# Patient Record
Sex: FEMALE | Race: WHITE | NOT HISPANIC OR LATINO
[De-identification: names, ages, dates, MRNs, and addresses within clinical notes are randomized per-mention and may not be internally consistent; named-entity substitution may affect disease eponyms.]

---

## 2017-01-12 ENCOUNTER — RESULT CHARGE (OUTPATIENT)
Age: 72
End: 2017-01-12

## 2017-01-12 ENCOUNTER — APPOINTMENT (OUTPATIENT)
Dept: FAMILY MEDICINE | Facility: CLINIC | Age: 72
End: 2017-01-12

## 2017-01-12 VITALS
BODY MASS INDEX: 30.83 KG/M2 | OXYGEN SATURATION: 96 % | WEIGHT: 174 LBS | TEMPERATURE: 98 F | DIASTOLIC BLOOD PRESSURE: 82 MMHG | HEIGHT: 63 IN | SYSTOLIC BLOOD PRESSURE: 134 MMHG | HEART RATE: 69 BPM

## 2017-01-12 DIAGNOSIS — Z23 ENCOUNTER FOR IMMUNIZATION: ICD-10-CM

## 2017-01-12 DIAGNOSIS — Z87.2 PERSONAL HISTORY OF DISEASES OF THE SKIN AND SUBCUTANEOUS TISSUE: ICD-10-CM

## 2017-01-12 DIAGNOSIS — M25.562 PAIN IN LEFT KNEE: ICD-10-CM

## 2017-01-12 DIAGNOSIS — Z86.39 PERSONAL HISTORY OF OTHER ENDOCRINE, NUTRITIONAL AND METABOLIC DISEASE: ICD-10-CM

## 2017-01-12 DIAGNOSIS — Z87.09 PERSONAL HISTORY OF OTHER DISEASES OF THE RESPIRATORY SYSTEM: ICD-10-CM

## 2017-01-12 DIAGNOSIS — B35.1 TINEA UNGUIUM: ICD-10-CM

## 2017-01-12 DIAGNOSIS — M79.672 PAIN IN LEFT FOOT: ICD-10-CM

## 2017-01-12 LAB — HBA1C MFR BLD HPLC: 8

## 2017-01-15 ENCOUNTER — RESULT REVIEW (OUTPATIENT)
Age: 72
End: 2017-01-15

## 2017-02-04 LAB
ALBUMIN SERPL ELPH-MCNC: 4.6 G/DL
ALP BLD-CCNC: 62 U/L
ALT SERPL-CCNC: 23 U/L
ANION GAP SERPL CALC-SCNC: 18 MMOL/L
AST SERPL-CCNC: 17 U/L
BILIRUB SERPL-MCNC: 0.5 MG/DL
BUN SERPL-MCNC: 17 MG/DL
CALCIUM SERPL-MCNC: 10 MG/DL
CHLORIDE SERPL-SCNC: 98 MMOL/L
CO2 SERPL-SCNC: 25 MMOL/L
CREAT SERPL-MCNC: 0.75 MG/DL
GLUCOSE SERPL-MCNC: 193 MG/DL
POTASSIUM SERPL-SCNC: 3.8 MMOL/L
PROT SERPL-MCNC: 7.4 G/DL
SODIUM SERPL-SCNC: 141 MMOL/L

## 2017-03-26 ENCOUNTER — FORM ENCOUNTER (OUTPATIENT)
Age: 72
End: 2017-03-26

## 2017-03-27 ENCOUNTER — APPOINTMENT (OUTPATIENT)
Dept: MAMMOGRAPHY | Facility: CLINIC | Age: 72
End: 2017-03-27

## 2017-03-27 ENCOUNTER — OUTPATIENT (OUTPATIENT)
Dept: OUTPATIENT SERVICES | Facility: HOSPITAL | Age: 72
LOS: 1 days | End: 2017-03-27
Payer: COMMERCIAL

## 2017-03-27 DIAGNOSIS — Z00.00 ENCOUNTER FOR GENERAL ADULT MEDICAL EXAMINATION WITHOUT ABNORMAL FINDINGS: ICD-10-CM

## 2017-03-27 PROCEDURE — 77067 SCR MAMMO BI INCL CAD: CPT

## 2017-03-27 PROCEDURE — 77063 BREAST TOMOSYNTHESIS BI: CPT

## 2017-04-05 ENCOUNTER — APPOINTMENT (OUTPATIENT)
Dept: FAMILY MEDICINE | Facility: CLINIC | Age: 72
End: 2017-04-05

## 2017-04-05 VITALS
SYSTOLIC BLOOD PRESSURE: 141 MMHG | DIASTOLIC BLOOD PRESSURE: 92 MMHG | HEART RATE: 64 BPM | HEIGHT: 63 IN | WEIGHT: 174 LBS | BODY MASS INDEX: 30.83 KG/M2 | TEMPERATURE: 98.1 F | OXYGEN SATURATION: 97 %

## 2017-04-05 LAB — HBA1C MFR BLD HPLC: 7.1

## 2017-06-15 ENCOUNTER — MEDICATION RENEWAL (OUTPATIENT)
Age: 72
End: 2017-06-15

## 2017-07-12 ENCOUNTER — RESULT CHARGE (OUTPATIENT)
Age: 72
End: 2017-07-12

## 2017-07-13 ENCOUNTER — APPOINTMENT (OUTPATIENT)
Dept: FAMILY MEDICINE | Facility: CLINIC | Age: 72
End: 2017-07-13

## 2017-07-13 VITALS
TEMPERATURE: 98.3 F | DIASTOLIC BLOOD PRESSURE: 80 MMHG | HEART RATE: 81 BPM | HEIGHT: 63 IN | WEIGHT: 173 LBS | SYSTOLIC BLOOD PRESSURE: 135 MMHG | OXYGEN SATURATION: 99 % | BODY MASS INDEX: 30.65 KG/M2

## 2017-07-14 LAB
25(OH)D3 SERPL-MCNC: 16.1 NG/ML
ALBUMIN SERPL ELPH-MCNC: 4.5 G/DL
ALP BLD-CCNC: 52 U/L
ALT SERPL-CCNC: 23 U/L
ANION GAP SERPL CALC-SCNC: 16 MMOL/L
APPEARANCE: CLEAR
AST SERPL-CCNC: 17 U/L
BACTERIA: NEGATIVE
BASOPHILS # BLD AUTO: 0.02 K/UL
BASOPHILS NFR BLD AUTO: 0.5 %
BILIRUB SERPL-MCNC: 0.4 MG/DL
BILIRUBIN URINE: NEGATIVE
BLOOD URINE: NEGATIVE
BUN SERPL-MCNC: 19 MG/DL
CALCIUM SERPL-MCNC: 10.1 MG/DL
CHLORIDE SERPL-SCNC: 101 MMOL/L
CHOLEST SERPL-MCNC: 212 MG/DL
CHOLEST/HDLC SERPL: 3 RATIO
CO2 SERPL-SCNC: 23 MMOL/L
COLOR: YELLOW
CREAT SERPL-MCNC: 0.82 MG/DL
CREAT SPEC-SCNC: 207 MG/DL
EOSINOPHIL # BLD AUTO: 0.09 K/UL
EOSINOPHIL NFR BLD AUTO: 2.1 %
GLUCOSE QUALITATIVE U: NORMAL MG/DL
GLUCOSE SERPL-MCNC: 155 MG/DL
HBA1C MFR BLD HPLC: 7.3
HCT VFR BLD CALC: 46.5 %
HDLC SERPL-MCNC: 70 MG/DL
HGB BLD-MCNC: 15.6 G/DL
IMM GRANULOCYTES NFR BLD AUTO: 0 %
KETONES URINE: NEGATIVE
LDLC SERPL CALC-MCNC: 121 MG/DL
LEUKOCYTE ESTERASE URINE: NEGATIVE
LYMPHOCYTES # BLD AUTO: 2.01 K/UL
LYMPHOCYTES NFR BLD AUTO: 46.1 %
MAN DIFF?: NORMAL
MCHC RBC-ENTMCNC: 30.4 PG
MCHC RBC-ENTMCNC: 33.5 GM/DL
MCV RBC AUTO: 90.5 FL
MICROALBUMIN 24H UR DL<=1MG/L-MCNC: 0.6 MG/DL
MICROALBUMIN/CREAT 24H UR-RTO: 3 MG/G
MICROSCOPIC-UA: NORMAL
MONOCYTES # BLD AUTO: 0.4 K/UL
MONOCYTES NFR BLD AUTO: 9.2 %
NEUTROPHILS # BLD AUTO: 1.84 K/UL
NEUTROPHILS NFR BLD AUTO: 42.1 %
NITRITE URINE: NEGATIVE
PH URINE: 6
PLATELET # BLD AUTO: 182 K/UL
POTASSIUM SERPL-SCNC: 4 MMOL/L
PROT SERPL-MCNC: 7.4 G/DL
PROTEIN URINE: NEGATIVE MG/DL
RBC # BLD: 5.14 M/UL
RBC # FLD: 12.6 %
RED BLOOD CELLS URINE: 0 /HPF
SODIUM SERPL-SCNC: 140 MMOL/L
SPECIFIC GRAVITY URINE: 1.03
SQUAMOUS EPITHELIAL CELLS: 10 /HPF
TRIGL SERPL-MCNC: 106 MG/DL
TSH SERPL-ACNC: 1.42 UIU/ML
UROBILINOGEN URINE: 1 MG/DL
WBC # FLD AUTO: 4.36 K/UL
WHITE BLOOD CELLS URINE: 1 /HPF

## 2017-07-24 ENCOUNTER — FORM ENCOUNTER (OUTPATIENT)
Age: 72
End: 2017-07-24

## 2017-07-25 ENCOUNTER — APPOINTMENT (OUTPATIENT)
Dept: RADIOLOGY | Facility: CLINIC | Age: 72
End: 2017-07-25

## 2017-07-25 ENCOUNTER — OUTPATIENT (OUTPATIENT)
Dept: OUTPATIENT SERVICES | Facility: HOSPITAL | Age: 72
LOS: 1 days | End: 2017-07-25
Payer: COMMERCIAL

## 2017-07-25 DIAGNOSIS — M85.80 OTHER SPECIFIED DISORDERS OF BONE DENSITY AND STRUCTURE, UNSPECIFIED SITE: ICD-10-CM

## 2017-07-25 PROCEDURE — 77080 DXA BONE DENSITY AXIAL: CPT

## 2017-09-08 ENCOUNTER — APPOINTMENT (OUTPATIENT)
Dept: FAMILY MEDICINE | Facility: CLINIC | Age: 72
End: 2017-09-08
Payer: MEDICARE

## 2017-09-08 VITALS
SYSTOLIC BLOOD PRESSURE: 138 MMHG | TEMPERATURE: 98.5 F | OXYGEN SATURATION: 100 % | BODY MASS INDEX: 31.01 KG/M2 | DIASTOLIC BLOOD PRESSURE: 76 MMHG | HEART RATE: 69 BPM | WEIGHT: 175 LBS | HEIGHT: 63 IN

## 2017-09-08 PROCEDURE — 99213 OFFICE O/P EST LOW 20 MIN: CPT

## 2017-09-11 LAB
ALBUMIN SERPL ELPH-MCNC: 3.7 G/DL
ALP BLD-CCNC: 49 U/L
ALT SERPL-CCNC: 20 U/L
ANION GAP SERPL CALC-SCNC: 14 MMOL/L
AST SERPL-CCNC: 16 U/L
BILIRUB SERPL-MCNC: 0.2 MG/DL
BUN SERPL-MCNC: 10 MG/DL
CALCIUM SERPL-MCNC: 9.5 MG/DL
CHLORIDE SERPL-SCNC: 103 MMOL/L
CO2 SERPL-SCNC: 27 MMOL/L
CREAT SERPL-MCNC: 0.59 MG/DL
GLUCOSE SERPL-MCNC: 125 MG/DL
POTASSIUM SERPL-SCNC: 3.7 MMOL/L
PROT SERPL-MCNC: 7.3 G/DL
SODIUM SERPL-SCNC: 144 MMOL/L

## 2017-10-23 ENCOUNTER — RESULT CHARGE (OUTPATIENT)
Age: 72
End: 2017-10-23

## 2017-10-23 ENCOUNTER — APPOINTMENT (OUTPATIENT)
Dept: FAMILY MEDICINE | Facility: CLINIC | Age: 72
End: 2017-10-23
Payer: MEDICARE

## 2017-10-23 VITALS
HEIGHT: 63 IN | OXYGEN SATURATION: 97 % | DIASTOLIC BLOOD PRESSURE: 74 MMHG | BODY MASS INDEX: 30.65 KG/M2 | SYSTOLIC BLOOD PRESSURE: 130 MMHG | TEMPERATURE: 98.7 F | WEIGHT: 173 LBS | HEART RATE: 69 BPM

## 2017-10-23 DIAGNOSIS — Z87.898 PERSONAL HISTORY OF OTHER SPECIFIED CONDITIONS: ICD-10-CM

## 2017-10-23 DIAGNOSIS — K52.9 NONINFECTIVE GASTROENTERITIS AND COLITIS, UNSPECIFIED: ICD-10-CM

## 2017-10-23 PROCEDURE — 99213 OFFICE O/P EST LOW 20 MIN: CPT | Mod: 25

## 2017-10-23 PROCEDURE — 83036 HEMOGLOBIN GLYCOSYLATED A1C: CPT | Mod: QW

## 2017-10-23 RX ORDER — METRONIDAZOLE 500 MG/1
500 TABLET ORAL TWICE DAILY
Qty: 14 | Refills: 0 | Status: COMPLETED | COMMUNITY
Start: 2017-09-08 | End: 2017-10-23

## 2017-10-23 RX ORDER — LOPERAMIDE HYDROCHLORIDE 2 MG/1
2 CAPSULE ORAL
Qty: 1 | Refills: 0 | Status: COMPLETED | COMMUNITY
Start: 2017-09-08 | End: 2017-10-23

## 2017-10-31 LAB — HBA1C MFR BLD HPLC: 7.3

## 2017-12-27 ENCOUNTER — RX RENEWAL (OUTPATIENT)
Age: 72
End: 2017-12-27

## 2018-01-12 ENCOUNTER — APPOINTMENT (OUTPATIENT)
Dept: GASTROENTEROLOGY | Facility: CLINIC | Age: 73
End: 2018-01-12

## 2018-01-22 ENCOUNTER — RESULT CHARGE (OUTPATIENT)
Age: 73
End: 2018-01-22

## 2018-01-22 ENCOUNTER — APPOINTMENT (OUTPATIENT)
Dept: FAMILY MEDICINE | Facility: CLINIC | Age: 73
End: 2018-01-22
Payer: COMMERCIAL

## 2018-01-22 VITALS
BODY MASS INDEX: 31.01 KG/M2 | TEMPERATURE: 97.8 F | SYSTOLIC BLOOD PRESSURE: 128 MMHG | WEIGHT: 175 LBS | OXYGEN SATURATION: 95 % | DIASTOLIC BLOOD PRESSURE: 75 MMHG | HEIGHT: 63 IN | HEART RATE: 79 BPM

## 2018-01-22 PROCEDURE — 99213 OFFICE O/P EST LOW 20 MIN: CPT | Mod: 25

## 2018-01-22 PROCEDURE — 83036 HEMOGLOBIN GLYCOSYLATED A1C: CPT | Mod: QW

## 2018-01-24 LAB — HBA1C MFR BLD HPLC: 7.8

## 2018-03-09 ENCOUNTER — APPOINTMENT (OUTPATIENT)
Dept: GASTROENTEROLOGY | Facility: CLINIC | Age: 73
End: 2018-03-09
Payer: COMMERCIAL

## 2018-03-09 VITALS — WEIGHT: 178 LBS | HEIGHT: 63 IN | BODY MASS INDEX: 31.54 KG/M2

## 2018-03-09 VITALS — SYSTOLIC BLOOD PRESSURE: 126 MMHG | DIASTOLIC BLOOD PRESSURE: 74 MMHG | HEART RATE: 70 BPM

## 2018-03-09 PROCEDURE — 99202 OFFICE O/P NEW SF 15 MIN: CPT

## 2018-04-30 ENCOUNTER — APPOINTMENT (OUTPATIENT)
Dept: FAMILY MEDICINE | Facility: CLINIC | Age: 73
End: 2018-04-30
Payer: COMMERCIAL

## 2018-04-30 ENCOUNTER — RESULT CHARGE (OUTPATIENT)
Age: 73
End: 2018-04-30

## 2018-04-30 VITALS
TEMPERATURE: 97.6 F | HEART RATE: 70 BPM | HEIGHT: 63 IN | DIASTOLIC BLOOD PRESSURE: 95 MMHG | WEIGHT: 174 LBS | OXYGEN SATURATION: 98 % | BODY MASS INDEX: 30.83 KG/M2 | SYSTOLIC BLOOD PRESSURE: 159 MMHG

## 2018-04-30 LAB — HBA1C MFR BLD HPLC: 7.9

## 2018-04-30 PROCEDURE — 99214 OFFICE O/P EST MOD 30 MIN: CPT

## 2018-06-04 ENCOUNTER — APPOINTMENT (OUTPATIENT)
Dept: FAMILY MEDICINE | Facility: CLINIC | Age: 73
End: 2018-06-04
Payer: COMMERCIAL

## 2018-06-04 VITALS
WEIGHT: 174 LBS | SYSTOLIC BLOOD PRESSURE: 126 MMHG | OXYGEN SATURATION: 98 % | DIASTOLIC BLOOD PRESSURE: 68 MMHG | TEMPERATURE: 98.4 F | HEART RATE: 72 BPM | BODY MASS INDEX: 30.83 KG/M2 | HEIGHT: 63 IN

## 2018-06-04 PROCEDURE — 99213 OFFICE O/P EST LOW 20 MIN: CPT | Mod: 25

## 2018-06-04 PROCEDURE — 93000 ELECTROCARDIOGRAM COMPLETE: CPT

## 2018-06-04 NOTE — ASSESSMENT
[FreeTextEntry1] : electrocardiogram completely within normal limits. Episode of dizziness and diaphoresis might have been related to vasovagal versus hypoglycemia. I have advised patient that in the event that these episodes become recurrent to check her fingerstick hasn't she can to ascertain whether this is a hypoglycemic event or otherwise.\par The patient will continue on current medications, no changes have been made today. No further workup at this time since the patient's symptoms and have been an isolated event and feels fine now.

## 2018-06-04 NOTE — HEALTH RISK ASSESSMENT
[No falls in past year] : Patient reported no falls in the past year [0] : 2) Feeling down, depressed, or hopeless: Not at all (0) [] : No [[id number]] : 0

## 2018-06-04 NOTE — HISTORY OF PRESENT ILLNESS
[FreeTextEntry8] : the patient presents to the office complaining of one episode of dizziness and diaphoresis which occurred 3 days ago while at work. Patient states that she woke up on Friday, June 1, her blood sugars fasting were 147, had her breakfast and went to work, while she was at work she entered the room which she states she was extremely hot a lot she was there working all of a sudden she became dizzy to the point that she had to sit down also became diaphoretic, the patient left the room and went to sit down to rest did not eat anything, did not check her blood pressure or her blood sugars, to some water and after 15 minutes she felt fine, she went home, 8 and went to sleep and she has been feeling fine since. Patient denies any nausea, vomiting, chest pain or shortness of breath.

## 2018-06-04 NOTE — PHYSICAL EXAM
[No Acute Distress] : no acute distress [Well Nourished] : well nourished [Well Developed] : well developed [Well-Appearing] : well-appearing [No JVD] : no jugular venous distention [Supple] : supple [No Lymphadenopathy] : no lymphadenopathy [Thyroid Normal, No Nodules] : the thyroid was normal and there were no nodules present [No Respiratory Distress] : no respiratory distress  [Clear to Auscultation] : lungs were clear to auscultation bilaterally [No Accessory Muscle Use] : no accessory muscle use [Normal Rate] : normal rate  [Regular Rhythm] : with a regular rhythm [Normal S1, S2] : normal S1 and S2 [No Murmur] : no murmur heard [No Edema] : there was no peripheral edema [Non Tender] : non-tender [No HSM] : no HSM

## 2018-06-18 ENCOUNTER — APPOINTMENT (OUTPATIENT)
Dept: GASTROENTEROLOGY | Facility: GI CENTER | Age: 73
End: 2018-06-18
Payer: COMMERCIAL

## 2018-06-18 ENCOUNTER — OUTPATIENT (OUTPATIENT)
Dept: OUTPATIENT SERVICES | Facility: HOSPITAL | Age: 73
LOS: 1 days | End: 2018-06-18
Payer: COMMERCIAL

## 2018-06-18 VITALS
WEIGHT: 174 LBS | BODY MASS INDEX: 30.83 KG/M2 | DIASTOLIC BLOOD PRESSURE: 70 MMHG | TEMPERATURE: 98 F | RESPIRATION RATE: 12 BRPM | SYSTOLIC BLOOD PRESSURE: 130 MMHG | HEART RATE: 80 BPM | HEIGHT: 63 IN

## 2018-06-18 DIAGNOSIS — Z12.11 ENCOUNTER FOR SCREENING FOR MALIGNANT NEOPLASM OF COLON: ICD-10-CM

## 2018-06-18 DIAGNOSIS — K64.8 OTHER HEMORRHOIDS: ICD-10-CM

## 2018-06-18 LAB — GLUCOSE BLDC GLUCOMTR-MCNC: 174 MG/DL — HIGH (ref 70–99)

## 2018-06-18 PROCEDURE — 45378 DIAGNOSTIC COLONOSCOPY: CPT

## 2018-06-18 PROCEDURE — 82962 GLUCOSE BLOOD TEST: CPT

## 2018-06-18 PROCEDURE — G0121: CPT

## 2018-07-16 ENCOUNTER — APPOINTMENT (OUTPATIENT)
Dept: FAMILY MEDICINE | Facility: CLINIC | Age: 73
End: 2018-07-16
Payer: COMMERCIAL

## 2018-07-16 VITALS
HEART RATE: 73 BPM | TEMPERATURE: 98.4 F | HEIGHT: 63 IN | BODY MASS INDEX: 31.54 KG/M2 | WEIGHT: 178 LBS | OXYGEN SATURATION: 96 % | SYSTOLIC BLOOD PRESSURE: 120 MMHG | DIASTOLIC BLOOD PRESSURE: 68 MMHG

## 2018-07-16 DIAGNOSIS — R20.2 PARESTHESIA OF SKIN: ICD-10-CM

## 2018-07-16 DIAGNOSIS — Z87.898 PERSONAL HISTORY OF OTHER SPECIFIED CONDITIONS: ICD-10-CM

## 2018-07-16 DIAGNOSIS — M25.572 PAIN IN LEFT ANKLE AND JOINTS OF LEFT FOOT: ICD-10-CM

## 2018-07-16 DIAGNOSIS — Z12.11 ENCOUNTER FOR SCREENING FOR MALIGNANT NEOPLASM OF COLON: ICD-10-CM

## 2018-07-16 PROCEDURE — G0439: CPT

## 2018-07-16 PROCEDURE — 99397 PER PM REEVAL EST PAT 65+ YR: CPT | Mod: 25

## 2018-07-16 PROCEDURE — 36415 COLL VENOUS BLD VENIPUNCTURE: CPT

## 2018-07-16 NOTE — PAST MEDICAL HISTORY
[Postmenopausal] : postmenopausal [Menarche Age ____] : age at menarche was [unfilled] [Menopause Age____] : age at menopause was [unfilled] [Total Preg ___] : G[unfilled] [Live Births ___] : P[unfilled]  [Full Term ___] : Full Term: [unfilled] [Living ___] : Living: [unfilled]

## 2018-07-16 NOTE — COUNSELING
[Weight management counseling provided] : Weight management [Healthy eating counseling provided] : healthy eating [Activity counseling provided] : activity [Low Fat Diet] : Low fat diet [Decrease Portions] : Decrease food portions [Low Salt Diet] : Low salt diet [___ min/wk activity recommended] : [unfilled] min/wk activity recommended [Walking] : Walking [None] : None [Good understanding] : Patient has a good understanding of lifestyle changes and the steps needed to achieve self management goals

## 2018-07-17 ENCOUNTER — RESULT CHARGE (OUTPATIENT)
Age: 73
End: 2018-07-17

## 2018-07-23 LAB — HBA1C MFR BLD HPLC: 7.8

## 2018-07-24 NOTE — ASSESSMENT
[FreeTextEntry1] : This is a 72-year-old female past medical history of hypertension, osteopenia, type 2 diabetes, lumbago, diverticulosis, presenting to the office for a complete physical exam. \par \par Cardiovascular: History hypertension.\par -Patient's blood pressure under control on current regimen.\par -Continue losartan/hydrochlorothiazide 100/25 once daily.\par -Continue simvastatin 10 mg at bedtime.\par -Diet and moderate exercise as tolerated has been discussed with the patient\par \par Endocrine: History of type 2 diabetes.\par -Continue metformin 850 mg twice daily, glipizide ER 5 mg once daily.\par -Continue blood glucose monitoring at least once a day.\par -Last hemoglobin A1c was 7.9 in April, today she A1c 7.8.\par -Last diabetic eye exam in January 2018, no retinopathy.\par \par Rheumatology: History of osteopenia.\par -Continue calcium and vitamin D supplementation.\par -Last bone density testing done in July 2017.\par \par Health care maintenance:\par -Patient had a mammogram done in March 2017, BI-RADS 2, will get a mammogram referral today.\par -Patient had a colonoscopy screening in June 2018.\par -Patient had bone density testing in July 2017, found with osteopenia.\par -Patient deferred influenza vaccine, Pneumonia vaccine and zoster vaccine.\par -Patient had a tetanus vaccine given in April 2017.

## 2018-07-24 NOTE — HISTORY OF PRESENT ILLNESS
[FreeTextEntry1] : " I am here for my physical exam " [de-identified] : This is a 72-year-old female past medical history of hypertension, osteopenia, type 2 diabetes, lumbago, diverticulosis, presenting to the office for a complete physical exam. The patient offers no acute complaints at this time.

## 2018-07-24 NOTE — HEALTH RISK ASSESSMENT
[Good] : ~his/her~  mood as  good [No falls in past year] : Patient reported no falls in the past year [0] : 2) Feeling down, depressed, or hopeless: Not at all (0) [Patient reported PAP Smear was normal] : Patient reported PAP Smear was normal [HIV test declined] : HIV test declined [Hepatitis C test declined] : Hepatitis C test declined [None] : None [Alone] : lives alone [Employed] : employed [High School] : high school [] :  [# Of Children ___] : has [unfilled] children [Feels Safe at Home] : Feels safe at home [Fully functional (bathing, dressing, toileting, transferring, walking, feeding)] : Fully functional (bathing, dressing, toileting, transferring, walking, feeding) [Fully functional (using the telephone, shopping, preparing meals, housekeeping, doing laundry, using] : Fully functional and needs no help or supervision to perform IADLs (using the telephone, shopping, preparing meals, housekeeping, doing laundry, using transportation, managing medications and managing finances) [Smoke Detector] : smoke detector [Carbon Monoxide Detector] : carbon monoxide detector [Seat Belt] :  uses seat belt [Sunscreen] : uses sunscreen [Patient declined discussion] : Patient declined discussion [] : No [[id number]] : 0 [Change in mental status noted] : No change in mental status noted [Language] : denies difficulty with language [Behavior] : denies difficulty with behavior [Learning/Retaining New Information] : denies difficulty learning/retaining new information [Handling Complex Tasks] : denies difficulty handling complex tasks [Reasoning] : denies difficulty with reasoning [Spatial Ability and Orientation] : denies difficulty with spatial ability and orientation [Sexually Active] : not sexually active [High Risk Behavior] : no high risk behavior [Reports changes in hearing] : Reports no changes in hearing [Reports changes in vision] : Reports no changes in vision [Reports changes in dental health] : Reports no changes in dental health [Guns at Home] : no guns at home [TB Exposure] : is not being exposed to tuberculosis [MammogramDate] : 03/2017 [PapSmearDate] : 03/2017 [BoneDensityDate] : 07/2017 [ColonoscopyDate] : 06/2018 [ColonoscopyComments] : Normal, repeat in 10 years. 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

## 2018-08-02 ENCOUNTER — MEDICATION RENEWAL (OUTPATIENT)
Age: 73
End: 2018-08-02

## 2018-08-02 LAB
25(OH)D3 SERPL-MCNC: 16.2 NG/ML
ALBUMIN SERPL ELPH-MCNC: 4.3 G/DL
ALP BLD-CCNC: 44 U/L
ALT SERPL-CCNC: 23 U/L
ANION GAP SERPL CALC-SCNC: 13 MMOL/L
AST SERPL-CCNC: 16 U/L
BASOPHILS # BLD AUTO: 0.02 K/UL
BASOPHILS NFR BLD AUTO: 0.4 %
BILIRUB SERPL-MCNC: 0.4 MG/DL
BUN SERPL-MCNC: 20 MG/DL
CALCIUM SERPL-MCNC: 9.5 MG/DL
CHLORIDE SERPL-SCNC: 103 MMOL/L
CHOLEST SERPL-MCNC: 206 MG/DL
CHOLEST/HDLC SERPL: 3.6 RATIO
CO2 SERPL-SCNC: 24 MMOL/L
CREAT SERPL-MCNC: 0.63 MG/DL
EOSINOPHIL # BLD AUTO: 0.08 K/UL
EOSINOPHIL NFR BLD AUTO: 1.8 %
GLUCOSE SERPL-MCNC: 185 MG/DL
HCT VFR BLD CALC: 45.1 %
HDLC SERPL-MCNC: 57 MG/DL
HGB BLD-MCNC: 14.9 G/DL
IMM GRANULOCYTES NFR BLD AUTO: 0 %
LDLC SERPL CALC-MCNC: 118 MG/DL
LYMPHOCYTES # BLD AUTO: 1.97 K/UL
LYMPHOCYTES NFR BLD AUTO: 44.3 %
MAN DIFF?: NORMAL
MCHC RBC-ENTMCNC: 29.3 PG
MCHC RBC-ENTMCNC: 33 GM/DL
MCV RBC AUTO: 88.6 FL
MONOCYTES # BLD AUTO: 0.41 K/UL
MONOCYTES NFR BLD AUTO: 9.2 %
NEUTROPHILS # BLD AUTO: 1.97 K/UL
NEUTROPHILS NFR BLD AUTO: 44.3 %
PLATELET # BLD AUTO: 190 K/UL
POTASSIUM SERPL-SCNC: 4.1 MMOL/L
PROT SERPL-MCNC: 7.2 G/DL
RBC # BLD: 5.09 M/UL
RBC # FLD: 12.2 %
SODIUM SERPL-SCNC: 140 MMOL/L
TRIGL SERPL-MCNC: 155 MG/DL
TSH SERPL-ACNC: 1.27 UIU/ML
WBC # FLD AUTO: 4.45 K/UL

## 2018-08-05 ENCOUNTER — FORM ENCOUNTER (OUTPATIENT)
Age: 73
End: 2018-08-05

## 2018-08-06 ENCOUNTER — OUTPATIENT (OUTPATIENT)
Dept: OUTPATIENT SERVICES | Facility: HOSPITAL | Age: 73
LOS: 1 days | End: 2018-08-06
Payer: MEDICARE

## 2018-08-06 ENCOUNTER — APPOINTMENT (OUTPATIENT)
Dept: MAMMOGRAPHY | Facility: CLINIC | Age: 73
End: 2018-08-06
Payer: COMMERCIAL

## 2018-08-06 DIAGNOSIS — Z00.00 ENCOUNTER FOR GENERAL ADULT MEDICAL EXAMINATION WITHOUT ABNORMAL FINDINGS: ICD-10-CM

## 2018-08-06 PROCEDURE — 77063 BREAST TOMOSYNTHESIS BI: CPT

## 2018-08-06 PROCEDURE — 77067 SCR MAMMO BI INCL CAD: CPT

## 2018-08-06 PROCEDURE — 77063 BREAST TOMOSYNTHESIS BI: CPT | Mod: 26

## 2018-08-06 PROCEDURE — 77067 SCR MAMMO BI INCL CAD: CPT | Mod: 26

## 2018-08-14 ENCOUNTER — RX RENEWAL (OUTPATIENT)
Age: 73
End: 2018-08-14

## 2018-09-06 ENCOUNTER — MEDICATION RENEWAL (OUTPATIENT)
Age: 73
End: 2018-09-06

## 2018-10-15 ENCOUNTER — APPOINTMENT (OUTPATIENT)
Dept: FAMILY MEDICINE | Facility: CLINIC | Age: 73
End: 2018-10-15
Payer: COMMERCIAL

## 2018-10-15 VITALS
BODY MASS INDEX: 31.54 KG/M2 | OXYGEN SATURATION: 95 % | TEMPERATURE: 97.71 F | HEIGHT: 63 IN | SYSTOLIC BLOOD PRESSURE: 157 MMHG | WEIGHT: 178 LBS | DIASTOLIC BLOOD PRESSURE: 73 MMHG | HEART RATE: 65 BPM

## 2018-10-15 LAB — HBA1C MFR BLD HPLC: 8.1

## 2018-10-15 PROCEDURE — 83036 HEMOGLOBIN GLYCOSYLATED A1C: CPT | Mod: QW

## 2018-10-15 PROCEDURE — 99214 OFFICE O/P EST MOD 30 MIN: CPT | Mod: 25

## 2018-10-15 NOTE — HEALTH RISK ASSESSMENT
[] : No [No falls in past year] : Patient reported no falls in the past year [0] : 2) Feeling down, depressed, or hopeless: Not at all (0) [[id number]] : 0

## 2018-10-15 NOTE — HISTORY OF PRESENT ILLNESS
[FreeTextEntry1] : " I am here for my diabetes check up" [de-identified] : The patient presents to the office for a diabetes checkup, no acute complaints at this time. The patient states that she has been taking metformin 850 mg orally once a day with her lunch because she states she was getting dizzy when she was taking it. Patient states that her fasting blood sugars are around 150s most of the time. Patient only takes her blood sugar levels fasting and is not checking them every day.

## 2018-10-15 NOTE — PLAN
[FreeTextEntry1] : \par \par Case discussed with and reviewed by supervising attending Dr. Sari Evans M.D.

## 2018-10-15 NOTE — PAST MEDICAL HISTORY
[Surgical Menopause] : in surgical menopause [Menarche Age ____] : age at menarche was [unfilled] [Menopause Age____] : age at menopause was [unfilled] [Total Preg ___] : G[unfilled] [Live Births ___] : P[unfilled]  [Full Term ___] : Full Term: [unfilled] [Living ___] : Living: [unfilled]

## 2018-10-15 NOTE — ASSESSMENT
[FreeTextEntry1] : This is a 72-year-old female past medical history of hypertension, osteopenia, type 2 diabetes, lumbago, diverticulosis, presenting to the office for Diabetes followup. \par \par Endocrine: History of type 2 diabetes.\par -Last hemoglobin A1c was 7.8, Today A1c is 8.1\par -Will decrease metformin to 850 mg once daily as patient was feeling dizzy and the patient was only taking it once a day. If numbers do not improve we may change metformin to 500 mg twice a day\par -Continue metformin 850 mg with last meal of the day, glipizide ER 5 mg once daily.\par -Fasting blood sugars goal is less than 120.\par -Continue blood glucose monitoring Twice a day.\par -Last diabetic eye exam in January 2018, no retinopathy.\par \par Cardiovascular: History hypertension.\par -Patient's blood pressure elevated today but the patient did not take her medication..\par -Continue losartan/hydrochlorothiazide 100/25 once daily.\par -Continue simvastatin 10 mg at bedtime.\par -Diet and moderate exercise as tolerated has been discussed with the patient\par \par Rheumatology: History of osteopenia, Low vitamin D level\par -Continue calcium and vitamin D supplementation.\par -Last bone density testing done in July 2017.\par \par Health care maintenance:\par -Patient had a mammogram done in August 2018, BI-RADS 2.\par -Patient has not had a recent Pap smear, will give her referral today.\par -Patient had a colonoscopy screening in June 2018.\par -Patient had bone density testing in July 2017, found with osteopenia.\par -Patient deferred influenza vaccine, Pneumonia vaccine and zoster vaccine.\par -Patient had a tetanus vaccine given in April 2017.

## 2019-01-14 ENCOUNTER — APPOINTMENT (OUTPATIENT)
Dept: FAMILY MEDICINE | Facility: CLINIC | Age: 74
End: 2019-01-14
Payer: COMMERCIAL

## 2019-01-14 ENCOUNTER — RECORD ABSTRACTING (OUTPATIENT)
Age: 74
End: 2019-01-14

## 2019-01-14 VITALS
DIASTOLIC BLOOD PRESSURE: 71 MMHG | OXYGEN SATURATION: 98 % | SYSTOLIC BLOOD PRESSURE: 120 MMHG | WEIGHT: 175 LBS | HEART RATE: 78 BPM | TEMPERATURE: 98.3 F | HEIGHT: 63 IN | BODY MASS INDEX: 31.01 KG/M2

## 2019-01-14 DIAGNOSIS — N95.2 POSTMENOPAUSAL ATROPHIC VAGINITIS: ICD-10-CM

## 2019-01-14 DIAGNOSIS — Z86.39 PERSONAL HISTORY OF OTHER ENDOCRINE, NUTRITIONAL AND METABOLIC DISEASE: ICD-10-CM

## 2019-01-14 LAB
CYTOLOGY CVX/VAG DOC THIN PREP: NORMAL
HBA1C MFR BLD HPLC: 7.9

## 2019-01-14 PROCEDURE — 99214 OFFICE O/P EST MOD 30 MIN: CPT | Mod: 25

## 2019-01-14 PROCEDURE — 83036 HEMOGLOBIN GLYCOSYLATED A1C: CPT | Mod: QW

## 2019-01-14 RX ORDER — IBUPROFEN 600 MG/1
600 TABLET, FILM COATED ORAL 3 TIMES DAILY
Qty: 15 | Refills: 1 | Status: COMPLETED | COMMUNITY
Start: 2018-04-30 | End: 2019-01-14

## 2019-01-14 NOTE — ASSESSMENT
[FreeTextEntry1] : This is a 72-year-old female past medical history of hypertension, osteopenia, type 2 diabetes, lumbago, diverticulosis, presenting to the office for Diabetes followup. \par \par Endocrine: History of type 2 diabetes.\par -Last hemoglobin A1c was 8.1, Today A1c is 7.9\par -Continue Metformin 850 mg once daily. If numbers do not improve we may change metformin to 500 mg twice a day, continue Glipizide ER 5 mg once daily.\par -Fasting blood sugars goal is less than 120.\par -Continue blood glucose monitoring daily.\par -Last diabetic eye exam in January 2018, no retinopathy, has appointment Feb 4th.\par \par Cardiovascular: History hypertension.\par -Patient's blood pressure normal.\par -Continue losartan/hydrochlorothiazide 100/25 once daily.\par -Continue simvastatin 10 mg at bedtime.\par -Diet and moderate exercise as tolerated has been discussed with the patient\par \par Rheumatology: History of osteopenia, Low vitamin D level\par -Continue calcium and vitamin D supplementation.\par -Last bone density testing done in July 2017.\par -Continue Vitamin D supp., will check level today.\par \par Health care maintenance:\par -Patient had a mammogram done in August 2018, BI-RADS 2.\par -Patient has appointment for Feb 24 with Dr Rausch.\par -Patient had a colonoscopy screening in June 2018.\par -Patient had bone density testing in July 2017, found with osteopenia.\par -Patient deferred influenza vaccine, Pneumonia vaccine and zoster vaccine.\par -Patient had a tetanus vaccine given in April 2017.\par -RTO in 3 months for diabetes check.

## 2019-01-14 NOTE — HISTORY OF PRESENT ILLNESS
[FreeTextEntry1] : medication refills [de-identified] : The patient presents to the office for a diabetes checkup, no acute complaints at this time. Pt states that her blood sugars rise mostly when she is nervous or preoccupied, states that she checks her sugars in that state and are in the high 170s but when she is rested with no issues is in the 130s fasting. Her last HgbA1c was 8.1

## 2019-01-14 NOTE — PHYSICAL EXAM
[No Acute Distress] : no acute distress [Well Nourished] : well nourished [Well Developed] : well developed [Well-Appearing] : well-appearing [No JVD] : no jugular venous distention [Supple] : supple [No Lymphadenopathy] : no lymphadenopathy [Thyroid Normal, No Nodules] : the thyroid was normal and there were no nodules present [No Respiratory Distress] : no respiratory distress  [Clear to Auscultation] : lungs were clear to auscultation bilaterally [No Accessory Muscle Use] : no accessory muscle use [Normal Rate] : normal rate  [Regular Rhythm] : with a regular rhythm [Normal S1, S2] : normal S1 and S2 [No Murmur] : no murmur heard [Soft] : abdomen soft [Non Tender] : non-tender [Normal Bowel Sounds] : normal bowel sounds [No Rash] : no rash [Normal Gait] : normal gait [Coordination Grossly Intact] : coordination grossly intact [No Focal Deficits] : no focal deficits [Normal Affect] : the affect was normal [Normal Insight/Judgement] : insight and judgment were intact

## 2019-01-14 NOTE — HEALTH RISK ASSESSMENT
[No falls in past year] : Patient reported no falls in the past year [0] : 2) Feeling down, depressed, or hopeless: Not at all (0) [] : No [[id number]] : 0

## 2019-01-15 ENCOUNTER — MEDICATION RENEWAL (OUTPATIENT)
Age: 74
End: 2019-01-15

## 2019-01-15 LAB
25(OH)D3 SERPL-MCNC: 21.7 NG/ML
ALBUMIN SERPL ELPH-MCNC: 4.3 G/DL
ALP BLD-CCNC: 57 U/L
ALT SERPL-CCNC: 25 U/L
ANION GAP SERPL CALC-SCNC: 14 MMOL/L
AST SERPL-CCNC: 22 U/L
BILIRUB SERPL-MCNC: 0.4 MG/DL
BUN SERPL-MCNC: 21 MG/DL
CALCIUM SERPL-MCNC: 9.9 MG/DL
CHLORIDE SERPL-SCNC: 105 MMOL/L
CO2 SERPL-SCNC: 24 MMOL/L
CREAT SERPL-MCNC: 0.7 MG/DL
GLUCOSE SERPL-MCNC: 144 MG/DL
POTASSIUM SERPL-SCNC: 4 MMOL/L
PROT SERPL-MCNC: 7.5 G/DL
SODIUM SERPL-SCNC: 143 MMOL/L

## 2019-01-17 ENCOUNTER — MEDICATION RENEWAL (OUTPATIENT)
Age: 74
End: 2019-01-17

## 2019-02-25 ENCOUNTER — APPOINTMENT (OUTPATIENT)
Dept: OBGYN | Facility: CLINIC | Age: 74
End: 2019-02-25

## 2019-03-25 ENCOUNTER — APPOINTMENT (OUTPATIENT)
Dept: OBGYN | Facility: CLINIC | Age: 74
End: 2019-03-25

## 2019-04-15 ENCOUNTER — APPOINTMENT (OUTPATIENT)
Dept: FAMILY MEDICINE | Facility: CLINIC | Age: 74
End: 2019-04-15

## 2019-07-16 ENCOUNTER — MEDICATION RENEWAL (OUTPATIENT)
Age: 74
End: 2019-07-16

## 2019-07-22 ENCOUNTER — APPOINTMENT (OUTPATIENT)
Dept: FAMILY MEDICINE | Facility: CLINIC | Age: 74
End: 2019-07-22
Payer: MEDICARE

## 2019-07-22 VITALS
SYSTOLIC BLOOD PRESSURE: 129 MMHG | RESPIRATION RATE: 15 BRPM | HEART RATE: 69 BPM | DIASTOLIC BLOOD PRESSURE: 72 MMHG | OXYGEN SATURATION: 98 % | HEIGHT: 63 IN | BODY MASS INDEX: 30.48 KG/M2 | WEIGHT: 172 LBS

## 2019-07-22 PROCEDURE — 83036 HEMOGLOBIN GLYCOSYLATED A1C: CPT | Mod: QW

## 2019-07-22 PROCEDURE — 99214 OFFICE O/P EST MOD 30 MIN: CPT | Mod: 25

## 2019-07-22 NOTE — PHYSICAL EXAM
[Pedal Pulses Present] : the pedal pulses are present [Normal] : normal rate, regular rhythm, normal S1 and S2 and no murmur heard [No Edema] : there was no peripheral edema [Normal Bowel Sounds] : normal bowel sounds [Soft] : abdomen soft [de-identified] : obese

## 2019-07-22 NOTE — HISTORY OF PRESENT ILLNESS
[FreeTextEntry1] : diabetes. [de-identified] : This is a 72-year-old female past medical history of hypertension, osteopenia, type 2 diabetes, lumbago, diverticulosis, presenting to the office for a complete physical exam. The patient offers no acute complaints at this time.

## 2019-07-22 NOTE — ASSESSMENT
[FreeTextEntry1] : This is a 72-year-old female past medical history of hypertension, osteopenia, type 2 diabetes, lumbago, diverticulosis, presenting to the office for Diabetes followup. \par \par Endocrine: History of type 2 diabetes.\par -Last hemoglobin A1c was 7.9, Today A1c is 6.8\par -Continue Metformin 850 mg once daily, continue Glipizide ER 5 mg once daily.\par -Fasting blood sugars goal is less than 120.\par -Continue blood glucose monitoring daily.\par -Last diabetic eye exam in January 201, no retinopathy. Will request records.\par \par Cardiovascular: History hypertension.\par -Patient's blood pressure normal.\par -Continue losartan/hydrochlorothiazide 100/25 once daily.\par -Continue simvastatin 10 mg at bedtime.\par -Diet and moderate exercise as tolerated has been discussed with the patient\par \par Rheumatology: History of osteopenia, Low vitamin D level\par -Continue calcium and vitamin D supplementation.\par -Last bone density testing done in July 2017.\par -Continue Vitamin D supp.\par \par Health care maintenance:\par -Patient had a mammogram done in August 2018, BI-RADS 2, referral given.\par -GYN Dr Rausch, will request records.\par -Patient had a colonoscopy screening in June 2018.\par -Patient had bone density testing in July 2017, found with osteopenia,, referral given.\par -Patient deferred influenza vaccine, Pneumonia vaccine and zoster vaccine.\par -Patient had a tetanus vaccine given in April 2017.\par -RTO in 3 months for CPE.

## 2019-07-22 NOTE — COUNSELING
[Weight management counseling provided] : Weight management [Good understanding] : Patient has a good understanding of disease, goals and obesity follow-up plan [Activity counseling provided] : activity [Healthy eating counseling provided] : healthy eating [Low Fat Diet] : Low fat diet [Low Salt Diet] : Low salt diet [Decrease Portions] : Decrease food portions [None] : None

## 2019-07-22 NOTE — HEALTH RISK ASSESSMENT
[No] : In the past 12 months have you used drugs other than those required for medical reasons? No [No falls in past year] : Patient reported no falls in the past year [0] : 2) Feeling down, depressed, or hopeless: Not at all (0) [] : No [de-identified] : none [de-identified] : salads, less rice and bread [[id number]] : 0

## 2019-07-22 NOTE — PAST MEDICAL HISTORY
[Surgical Menopause] : in surgical menopause [Menarche Age ____] : age at menarche was [unfilled] [Menopause Age____] : age at menopause was [unfilled] [Total Preg ___] : G[unfilled] [Live Births ___] : P[unfilled]  [Living ___] : Living: [unfilled] [Full Term ___] : Full Term: [unfilled]

## 2019-07-25 LAB — HBA1C MFR BLD HPLC: 6.8

## 2019-08-11 ENCOUNTER — FORM ENCOUNTER (OUTPATIENT)
Age: 74
End: 2019-08-11

## 2019-08-12 ENCOUNTER — APPOINTMENT (OUTPATIENT)
Dept: MAMMOGRAPHY | Facility: CLINIC | Age: 74
End: 2019-08-12
Payer: MEDICARE

## 2019-08-12 ENCOUNTER — APPOINTMENT (OUTPATIENT)
Dept: RADIOLOGY | Facility: CLINIC | Age: 74
End: 2019-08-12
Payer: MEDICARE

## 2019-08-12 ENCOUNTER — OUTPATIENT (OUTPATIENT)
Dept: OUTPATIENT SERVICES | Facility: HOSPITAL | Age: 74
LOS: 1 days | End: 2019-08-12
Payer: MEDICARE

## 2019-08-12 DIAGNOSIS — Z13.820 ENCOUNTER FOR SCREENING FOR OSTEOPOROSIS: ICD-10-CM

## 2019-08-12 DIAGNOSIS — Z12.31 ENCOUNTER FOR SCREENING MAMMOGRAM FOR MALIGNANT NEOPLASM OF BREAST: ICD-10-CM

## 2019-08-12 PROCEDURE — 77063 BREAST TOMOSYNTHESIS BI: CPT | Mod: 26

## 2019-08-12 PROCEDURE — 77067 SCR MAMMO BI INCL CAD: CPT | Mod: 26

## 2019-08-12 PROCEDURE — 77067 SCR MAMMO BI INCL CAD: CPT

## 2019-08-12 PROCEDURE — 77063 BREAST TOMOSYNTHESIS BI: CPT

## 2019-09-19 ENCOUNTER — FORM ENCOUNTER (OUTPATIENT)
Age: 74
End: 2019-09-19

## 2019-09-20 ENCOUNTER — APPOINTMENT (OUTPATIENT)
Dept: RADIOLOGY | Facility: CLINIC | Age: 74
End: 2019-09-20
Payer: MEDICARE

## 2019-09-20 ENCOUNTER — OUTPATIENT (OUTPATIENT)
Dept: OUTPATIENT SERVICES | Facility: HOSPITAL | Age: 74
LOS: 1 days | End: 2019-09-20
Payer: MEDICARE

## 2019-09-20 DIAGNOSIS — M85.80 OTHER SPECIFIED DISORDERS OF BONE DENSITY AND STRUCTURE, UNSPECIFIED SITE: ICD-10-CM

## 2019-09-20 DIAGNOSIS — Z00.00 ENCOUNTER FOR GENERAL ADULT MEDICAL EXAMINATION WITHOUT ABNORMAL FINDINGS: ICD-10-CM

## 2019-09-20 PROCEDURE — 77080 DXA BONE DENSITY AXIAL: CPT

## 2019-09-20 PROCEDURE — 77080 DXA BONE DENSITY AXIAL: CPT | Mod: 26

## 2019-09-23 ENCOUNTER — APPOINTMENT (OUTPATIENT)
Dept: FAMILY MEDICINE | Facility: CLINIC | Age: 74
End: 2019-09-23
Payer: MEDICARE

## 2019-09-23 VITALS
HEART RATE: 66 BPM | BODY MASS INDEX: 30.83 KG/M2 | HEIGHT: 63 IN | OXYGEN SATURATION: 98 % | SYSTOLIC BLOOD PRESSURE: 124 MMHG | WEIGHT: 174 LBS | DIASTOLIC BLOOD PRESSURE: 82 MMHG | TEMPERATURE: 98.1 F

## 2019-09-23 PROCEDURE — 99213 OFFICE O/P EST LOW 20 MIN: CPT

## 2019-09-23 NOTE — ASSESSMENT
[FreeTextEntry1] : This is a 72-year-old female past medical history of hypertension, osteopenia, type 2 diabetes, lumbago, diverticulosis, presenting to the office for followup. Pt had blood work drawn at Dr Becker's office, will request records.. \par \par Endocrine: History of type 2 diabetes.\par -Last hemoglobin A1c was 6.8 in July\par -Continue Metformin 850 mg once daily, continue Glipizide ER 5 mg once daily.\par -Fasting blood sugars goal is less than 120.\par -Continue blood glucose monitoring daily.\par -Last diabetic eye exam in January 2018, no retinopathy. Will request records.\par \par Cardiovascular: History hypertension.\par -Patient's blood pressure normal.\par -Continue losartan/hydrochlorothiazide 100/25 once daily.\par -Continue simvastatin 10 mg at bedtime.\par -Diet and moderate exercise as tolerated has been discussed with the patient\par \par Rheumatology: History of osteopenia, Low vitamin D level\par -Continue calcium and vitamin D supplementation.\par -Last bone density testing done in Sept 2019, no changes, Osteopenia..\par \par Health care maintenance:\par -Patient had a mammogram done in August 2019, BI-RADS 1.\par -GYN Dr Rausch.\par -Patient had a colonoscopy screening in June 2018, f/w Diverticulosis..\par -Patient had bone density testing in July 2017, found with osteopenia.\par -Patient deferred influenza vaccine, Pneumonia vaccine and zoster vaccine.\par -Patient had a tetanus vaccine given in April 2017.\par

## 2019-09-23 NOTE — PHYSICAL EXAM
[Pedal Pulses Present] : the pedal pulses are present [Normal] : normal rate, regular rhythm, normal S1 and S2 and no murmur heard [No Edema] : there was no peripheral edema [Soft] : abdomen soft [Normal Bowel Sounds] : normal bowel sounds [de-identified] : obese

## 2019-09-23 NOTE — HISTORY OF PRESENT ILLNESS
[FreeTextEntry1] : follow up. [de-identified] : This is a 72-year-old female past medical history of hypertension, osteopenia, type 2 diabetes, lumbago, diverticulosis, presenting to the office for follow up. The patient offers no acute complaints at this time.

## 2019-09-23 NOTE — HEALTH RISK ASSESSMENT
[No] : In the past 12 months have you used drugs other than those required for medical reasons? No [No falls in past year] : Patient reported no falls in the past year [0] : 2) Feeling down, depressed, or hopeless: Not at all (0) [] : No [de-identified] : none [de-identified] : salads, less rice and bread [[id number]] : 0

## 2019-09-23 NOTE — COUNSELING
[Fall prevention counseling provided] : Fall prevention counseling provided [AUDIT-C Screening administered and reviewed] : AUDIT-C Screening administered and reviewed [None] : None

## 2019-11-11 ENCOUNTER — MEDICATION RENEWAL (OUTPATIENT)
Age: 74
End: 2019-11-11

## 2019-11-26 ENCOUNTER — MEDICATION RENEWAL (OUTPATIENT)
Age: 74
End: 2019-11-26

## 2019-12-02 ENCOUNTER — APPOINTMENT (OUTPATIENT)
Dept: FAMILY MEDICINE | Facility: CLINIC | Age: 74
End: 2019-12-02
Payer: MEDICARE

## 2019-12-02 VITALS
TEMPERATURE: 97.6 F | WEIGHT: 178 LBS | HEIGHT: 63 IN | HEART RATE: 70 BPM | SYSTOLIC BLOOD PRESSURE: 133 MMHG | BODY MASS INDEX: 31.54 KG/M2 | DIASTOLIC BLOOD PRESSURE: 88 MMHG | OXYGEN SATURATION: 96 %

## 2019-12-02 LAB — HBA1C MFR BLD HPLC: 8.3

## 2019-12-02 PROCEDURE — G0439: CPT

## 2019-12-02 PROCEDURE — 36415 COLL VENOUS BLD VENIPUNCTURE: CPT

## 2019-12-02 PROCEDURE — 83036 HEMOGLOBIN GLYCOSYLATED A1C: CPT | Mod: QW

## 2019-12-02 NOTE — ASSESSMENT
[FreeTextEntry1] : This is a 72-year-old female past medical history of hypertension, osteopenia, type 2 diabetes, lumbago, diverticulosis, presenting to the office for CPE.\par \par Endocrine: History of type 2 diabetes.\par -Last hemoglobin A1c was 6.8 in July, today 8.3\par -Pt admitted [place] overeating bread and Oranges/bananas.\par -Dietary changes discussed.\par -Continue Metformin 850 mg once daily, continue Glipizide ER 5 mg once daily, added Januvia 50 mg.\par -Fasting blood sugars goal to be less than 120.\par -Continue blood glucose monitoring daily.\par -Last diabetic eye exam in June 2019, no retinopathy. Will request records.\par \par Cardiovascular: History hypertension.\par -Patient's blood pressure normal.\par -Continue losartan 1000, hydrochlorothiazide 25 once daily.\par -Continue simvastatin 10 mg at bedtime.\par -Diet and moderate exercise as tolerated has been discussed with the patient\par \par Rheumatology: History of osteopenia, Low vitamin D level\par -Continue calcium and vitamin D supplementation.\par -Last bone density testing done in Sept 2019, no changes, Osteopenia..\par \par Health care maintenance:\par -Patient had a mammogram done in August 2019, BI-RADS 1.\par -GYN Dr Rausch.\par -Patient had a colonoscopy screening in June 2018, f/w Diverticulosis..\par -Patient had bone density testing in Aug 2019 found with osteopenia.\par -Patient deferred influenza vaccine, Pneumonia vaccine and zoster vaccine.\par -Patient had a tetanus vaccine given in April 2017.\par

## 2019-12-02 NOTE — PHYSICAL EXAM
[No Acute Distress] : no acute distress [Well Nourished] : well nourished [Well Developed] : well developed [Well-Appearing] : well-appearing [PERRL] : pupils equal round and reactive to light [Normal Sclera/Conjunctiva] : normal sclera/conjunctiva [EOMI] : extraocular movements intact [Normal Outer Ear/Nose] : the outer ears and nose were normal in appearance [Normal Oropharynx] : the oropharynx was normal [No JVD] : no jugular venous distention [Supple] : supple [No Lymphadenopathy] : no lymphadenopathy [Thyroid Normal, No Nodules] : the thyroid was normal and there were no nodules present [No Respiratory Distress] : no respiratory distress  [No Accessory Muscle Use] : no accessory muscle use [Clear to Auscultation] : lungs were clear to auscultation bilaterally [Normal Rate] : normal rate  [Regular Rhythm] : with a regular rhythm [Normal S1, S2] : normal S1 and S2 [No Murmur] : no murmur heard [No Carotid Bruits] : no carotid bruits [No Abdominal Bruit] : a ~M bruit was not heard ~T in the abdomen [No Varicosities] : no varicosities [Pedal Pulses Present] : the pedal pulses are present [No Palpable Aorta] : no palpable aorta [No Edema] : there was no peripheral edema [No Extremity Clubbing/Cyanosis] : no extremity clubbing/cyanosis [Soft] : abdomen soft [Non Tender] : non-tender [Non-distended] : non-distended [No Masses] : no abdominal mass palpated [No HSM] : no HSM [Normal Bowel Sounds] : normal bowel sounds [Normal Posterior Cervical Nodes] : no posterior cervical lymphadenopathy [Normal Anterior Cervical Nodes] : no anterior cervical lymphadenopathy [No CVA Tenderness] : no CVA  tenderness [No Spinal Tenderness] : no spinal tenderness [No Joint Swelling] : no joint swelling [No Rash] : no rash [Grossly Normal Strength/Tone] : grossly normal strength/tone [Coordination Grossly Intact] : coordination grossly intact [No Focal Deficits] : no focal deficits [Deep Tendon Reflexes (DTR)] : deep tendon reflexes were 2+ and symmetric [Normal Gait] : normal gait [Normal Affect] : the affect was normal [Normal Insight/Judgement] : insight and judgment were intact [de-identified] : obese

## 2019-12-02 NOTE — PAST MEDICAL HISTORY
[Menarche Age ____] : age at menarche was [unfilled] [Menopause Age____] : age at menopause was [unfilled] [Total Preg ___] : G[unfilled] [Live Births ___] : P[unfilled]  [Full Term ___] : Full Term: [unfilled] [Living ___] : Living: [unfilled] [Postmenopausal] : postmenopausal

## 2019-12-02 NOTE — HISTORY OF PRESENT ILLNESS
[FreeTextEntry1] : physical exam [de-identified] : This is a 72-year-old female past medical history of hypertension, osteopenia, type 2 diabetes, lumbago, diverticulosis, presenting to the office for CPE. The patient offers no acute complaints at this time.

## 2019-12-11 LAB
25(OH)D3 SERPL-MCNC: 26.8 NG/ML
ALBUMIN SERPL ELPH-MCNC: 4.5 G/DL
ALP BLD-CCNC: 56 U/L
ALT SERPL-CCNC: 26 U/L
ANION GAP SERPL CALC-SCNC: 15 MMOL/L
APPEARANCE: CLEAR
AST SERPL-CCNC: 18 U/L
BACTERIA: NEGATIVE
BASOPHILS # BLD AUTO: 0.03 K/UL
BASOPHILS NFR BLD AUTO: 0.7 %
BILIRUB SERPL-MCNC: 0.4 MG/DL
BILIRUBIN URINE: NEGATIVE
BLOOD URINE: NEGATIVE
BUN SERPL-MCNC: 18 MG/DL
CALCIUM SERPL-MCNC: 10 MG/DL
CHLORIDE SERPL-SCNC: 101 MMOL/L
CHOLEST SERPL-MCNC: 211 MG/DL
CHOLEST/HDLC SERPL: 3.4 RATIO
CO2 SERPL-SCNC: 26 MMOL/L
COLOR: YELLOW
CREAT SERPL-MCNC: 0.59 MG/DL
EOSINOPHIL # BLD AUTO: 0.09 K/UL
EOSINOPHIL NFR BLD AUTO: 2.1 %
GLUCOSE QUALITATIVE U: NEGATIVE
GLUCOSE SERPL-MCNC: 182 MG/DL
HCT VFR BLD CALC: 48 %
HDLC SERPL-MCNC: 63 MG/DL
HGB BLD-MCNC: 16.1 G/DL
HYALINE CASTS: 0 /LPF
IMM GRANULOCYTES NFR BLD AUTO: 0.2 %
KETONES URINE: NEGATIVE
LDLC SERPL CALC-MCNC: 121 MG/DL
LEUKOCYTE ESTERASE URINE: NEGATIVE
LYMPHOCYTES # BLD AUTO: 2.14 K/UL
LYMPHOCYTES NFR BLD AUTO: 49.7 %
MAN DIFF?: NORMAL
MCHC RBC-ENTMCNC: 29.7 PG
MCHC RBC-ENTMCNC: 33.5 GM/DL
MCV RBC AUTO: 88.4 FL
MICROSCOPIC-UA: NORMAL
MONOCYTES # BLD AUTO: 0.33 K/UL
MONOCYTES NFR BLD AUTO: 7.7 %
NEUTROPHILS # BLD AUTO: 1.71 K/UL
NEUTROPHILS NFR BLD AUTO: 39.6 %
NITRITE URINE: NEGATIVE
PH URINE: 6
PLATELET # BLD AUTO: 196 K/UL
POTASSIUM SERPL-SCNC: 4 MMOL/L
PROT SERPL-MCNC: 7.2 G/DL
PROTEIN URINE: NEGATIVE
RBC # BLD: 5.43 M/UL
RBC # FLD: 11.6 %
RED BLOOD CELLS URINE: 2 /HPF
SODIUM SERPL-SCNC: 142 MMOL/L
SPECIFIC GRAVITY URINE: 1.03
SQUAMOUS EPITHELIAL CELLS: 9 /HPF
TRIGL SERPL-MCNC: 134 MG/DL
TSH SERPL-ACNC: 1.88 UIU/ML
UROBILINOGEN URINE: NORMAL
WBC # FLD AUTO: 4.31 K/UL
WHITE BLOOD CELLS URINE: 1 /HPF

## 2020-01-06 ENCOUNTER — APPOINTMENT (OUTPATIENT)
Dept: FAMILY MEDICINE | Facility: CLINIC | Age: 75
End: 2020-01-06
Payer: MEDICARE

## 2020-01-06 VITALS
OXYGEN SATURATION: 99 % | WEIGHT: 178 LBS | HEART RATE: 61 BPM | BODY MASS INDEX: 31.54 KG/M2 | DIASTOLIC BLOOD PRESSURE: 82 MMHG | SYSTOLIC BLOOD PRESSURE: 155 MMHG | TEMPERATURE: 97.8 F | HEIGHT: 63 IN

## 2020-01-06 PROCEDURE — 99213 OFFICE O/P EST LOW 20 MIN: CPT

## 2020-01-06 RX ORDER — SITAGLIPTIN 50 MG/1
50 TABLET, FILM COATED ORAL DAILY
Qty: 90 | Refills: 1 | Status: DISCONTINUED | COMMUNITY
Start: 2019-12-02 | End: 2020-01-06

## 2020-01-06 RX ORDER — ERGOCALCIFEROL 1.25 MG/1
1.25 MG CAPSULE, LIQUID FILLED ORAL
Qty: 8 | Refills: 1 | Status: COMPLETED | COMMUNITY
Start: 2018-08-02 | End: 2020-01-06

## 2020-01-06 NOTE — PAST MEDICAL HISTORY
[Menopause Age____] : age at menopause was [unfilled] [Menarche Age ____] : age at menarche was [unfilled] [Postmenopausal] : postmenopausal [Total Preg ___] : G[unfilled] [Live Births ___] : P[unfilled]  [Full Term ___] : Full Term: [unfilled] [Living ___] : Living: [unfilled]

## 2020-01-06 NOTE — HISTORY OF PRESENT ILLNESS
[FreeTextEntry1] : medication refills and diabetes check. [de-identified] : This is a 72-year-old female past medical history of hypertension, osteopenia, type 2 diabetes, lumbago, diverticulosis, presenting to the office for medication refills. The patient offers no acute complaints at this time, states that her FBS are less than 130 daily fasting..

## 2020-01-06 NOTE — COUNSELING
[AUDIT-C Screening administered and reviewed] : AUDIT-C Screening administered and reviewed [Good understanding] : Patient has a good understanding of lifestyle changes and steps needed to achieve self management goal [None] : None

## 2020-01-06 NOTE — HEALTH RISK ASSESSMENT
[No] : In the past 12 months have you used drugs other than those required for medical reasons? No [No falls in past year] : Patient reported no falls in the past year [0] : 2) Feeling down, depressed, or hopeless: Not at all (0) [de-identified] : former smoker x 1 yr 14 yr ago. [] : No [Audit-CScore] : 0 [de-identified] : none [de-identified] : salads, less rice and bread [[id number]] : 0

## 2020-01-06 NOTE — PHYSICAL EXAM
[Normal] : normal rate, regular rhythm, normal S1 and S2 and no murmur heard [Soft] : abdomen soft [Normal Bowel Sounds] : normal bowel sounds

## 2020-01-06 NOTE — ASSESSMENT
[FreeTextEntry1] : This is a 72-year-old female past medical history of hypertension, osteopenia, type 2 diabetes, lumbago, diverticulosis, presenting to the office for medication refills.\par \par Endocrine: History of type 2 diabetes.\par -Last hemoglobin A1c was 8.3 in December\par -Pt admitted to overeating bread and Oranges/bananas but stopped since last visit.\par -Dietary changes discussed.\par -Metformin increased to 1000 mg once daily, continue Glipizide ER 5 mg once daily, added Januvia 50 mg during last visit but was too expensive.Will switch to Tradjenta, if too expensive pt is to call the office for options.\par -Fasting blood sugars goal to be less than 120.\par -Continue blood glucose monitoring daily.\par -Last diabetic eye exam in June 2019, no retinopathy.\par \par Cardiovascular: History hypertension.\par -Patient's blood pressure normal.\par -Continue losartan 1000, hydrochlorothiazide 25 once daily.\par -Continue simvastatin 10 mg at bedtime.\par -Diet and moderate exercise as tolerated has been discussed with the patient\par \par Rheumatology: History of osteopenia, Low vitamin D level\par -Continue calcium and vitamin D supplementation.\par -Last bone density testing done in Sept 2019, no changes, Osteopenia..\par \par Health care maintenance:\par -Patient had a mammogram done in August 2019, BI-RADS 1.\par -GYN Dr Rausch.\par -Patient had a colonoscopy screening in June 2018, f/w Diverticulosis..\par -Patient had bone density testing in Aug 2019 found with osteopenia.\par -Patient deferred influenza vaccine, Pneumonia vaccine and zoster vaccine.\par -Patient had a tetanus vaccine given in April 2017.\par

## 2020-01-08 RX ORDER — LINAGLIPTIN 5 MG/1
5 TABLET, FILM COATED ORAL DAILY
Qty: 30 | Refills: 3 | Status: DISCONTINUED | COMMUNITY
Start: 2020-01-06 | End: 2020-01-08

## 2020-03-02 ENCOUNTER — APPOINTMENT (OUTPATIENT)
Dept: FAMILY MEDICINE | Facility: CLINIC | Age: 75
End: 2020-03-02
Payer: MEDICARE

## 2020-03-02 VITALS
RESPIRATION RATE: 16 BRPM | TEMPERATURE: 97.6 F | HEIGHT: 63 IN | DIASTOLIC BLOOD PRESSURE: 83 MMHG | SYSTOLIC BLOOD PRESSURE: 143 MMHG | OXYGEN SATURATION: 98 % | BODY MASS INDEX: 30.83 KG/M2 | WEIGHT: 174 LBS | HEART RATE: 71 BPM

## 2020-03-02 LAB — HBA1C MFR BLD HPLC: 7.6

## 2020-03-02 PROCEDURE — 99213 OFFICE O/P EST LOW 20 MIN: CPT | Mod: 25

## 2020-03-02 PROCEDURE — 83036 HEMOGLOBIN GLYCOSYLATED A1C: CPT | Mod: QW

## 2020-03-02 RX ORDER — BLOOD SUGAR DIAGNOSTIC
STRIP MISCELLANEOUS TWICE DAILY
Qty: 180 | Refills: 3 | Status: COMPLETED | COMMUNITY
Start: 2020-01-08 | End: 2020-03-02

## 2020-03-02 RX ORDER — BLOOD-GLUCOSE METER
KIT MISCELLANEOUS
Qty: 1 | Refills: 0 | Status: COMPLETED | COMMUNITY
Start: 2020-01-08 | End: 2020-03-02

## 2020-03-02 NOTE — HISTORY OF PRESENT ILLNESS
[FreeTextEntry1] : medication refills and diabetes check. [de-identified] : This is a 72-year-old female past medical history of hypertension, osteopenia, type 2 diabetes, lumbago, diverticulosis, presenting to the office for medication refills. The patient offers no acute complaints at this time, states that her FBS are between 120-155 daily fasting.

## 2020-03-02 NOTE — HEALTH RISK ASSESSMENT
[No] : In the past 12 months have you used drugs other than those required for medical reasons? No [No falls in past year] : Patient reported no falls in the past year [0] : 1) Little interest or pleasure doing things: Not at all (0) [] : No [de-identified] : former smoker x 1 yr 14 yr ago. [Audit-CScore] : 0 [de-identified] : none [de-identified] : salads, less rice and bread [[id number]] : 0

## 2020-03-02 NOTE — COUNSELING
[AUDIT-C Screening administered and reviewed] : AUDIT-C Screening administered and reviewed [None] : None [Good understanding] : Patient has a good understanding of lifestyle changes and steps needed to achieve self management goal

## 2020-03-02 NOTE — ASSESSMENT
[FreeTextEntry1] : This is a 72-year-old female past medical history of hypertension, osteopenia, type 2 diabetes, lumbago, diverticulosis, presenting to the office for medication refills.\par \par Endocrine: History of type 2 diabetes.\par -Hemoglobin A1c improved to 7.6.\par -Dietary changes discussed.\par -Metformin 1000 mg once daily, continue Glipizide ER 5 mg once daily,  Pioglitazone 15 mg qd.\par -Fasting blood sugars goal to be less than 120.\par -Continue blood glucose monitoring daily.\par -Last diabetic eye exam in June 2019, no retinopathy.\par \par Cardiovascular: History hypertension.\par -Patient's blood pressure normal.\par -Continue losartan 1000, hydrochlorothiazide 25 once daily.\par -Continue simvastatin 10 mg at bedtime.\par -Diet and moderate exercise as tolerated has been discussed with the patient\par \par Rheumatology: History of osteopenia, Low vitamin D level\par -Continue calcium and vitamin D supplementation.\par -Last bone density testing done in Sept 2019, no changes, Osteopenia..\par \par Health care maintenance:\par -Patient had a mammogram done in August 2019, BI-RADS 1.\par -GYN Dr Rausch.\par -Patient had a colonoscopy screening in June 2018, f/w Diverticulosis..\par -Patient had bone density testing in Aug 2019 found with osteopenia.\par -Patient deferred influenza vaccine, Pneumonia vaccine and zoster vaccine.\par -Patient had a tetanus vaccine given in April 2017.\par

## 2020-05-19 ENCOUNTER — RX RENEWAL (OUTPATIENT)
Age: 75
End: 2020-05-19

## 2020-05-31 ENCOUNTER — RESULT CHARGE (OUTPATIENT)
Age: 75
End: 2020-05-31

## 2020-06-01 ENCOUNTER — LABORATORY RESULT (OUTPATIENT)
Age: 75
End: 2020-06-01

## 2020-06-01 ENCOUNTER — APPOINTMENT (OUTPATIENT)
Dept: FAMILY MEDICINE | Facility: CLINIC | Age: 75
End: 2020-06-01
Payer: MEDICARE

## 2020-06-01 VITALS
OXYGEN SATURATION: 98 % | WEIGHT: 185 LBS | TEMPERATURE: 98 F | HEART RATE: 75 BPM | HEIGHT: 63 IN | DIASTOLIC BLOOD PRESSURE: 89 MMHG | BODY MASS INDEX: 32.78 KG/M2 | SYSTOLIC BLOOD PRESSURE: 159 MMHG

## 2020-06-01 PROCEDURE — 83036 HEMOGLOBIN GLYCOSYLATED A1C: CPT | Mod: QW

## 2020-06-01 PROCEDURE — 36415 COLL VENOUS BLD VENIPUNCTURE: CPT

## 2020-06-01 PROCEDURE — 99214 OFFICE O/P EST MOD 30 MIN: CPT | Mod: 25

## 2020-06-01 NOTE — HISTORY OF PRESENT ILLNESS
[FreeTextEntry1] : medication refills and diabetes check. [de-identified] : This is a 74-year-old female past medical history of hypertension, osteopenia, type 2 diabetes, lumbago, diverticulosis, presenting to the office for medication refills. The patient offers no acute complaints at this time, states that her FBS are between 90s-140s daily fasting.

## 2020-06-01 NOTE — COUNSELING
[AUDIT-C Screening administered and reviewed] : AUDIT-C Screening administered and reviewed [None] : None [Good understanding] : Patient has a good understanding of lifestyle changes and steps needed to achieve self management goal [Encouraged to increase physical activity] : Encouraged to increase physical activity [Benefits of weight loss discussed] : Benefits of weight loss discussed [____ min/wk Activity] : [unfilled] min/wk activity

## 2020-06-01 NOTE — PHYSICAL EXAM
[Soft] : abdomen soft [Normal Bowel Sounds] : normal bowel sounds [Normal] : no rash [No Edema] : there was no peripheral edema [Normal Insight/Judgement] : insight and judgment were intact [Normal Affect] : the affect was normal

## 2020-06-01 NOTE — HEALTH RISK ASSESSMENT
[No] : In the past 12 months have you used drugs other than those required for medical reasons? No [No falls in past year] : Patient reported no falls in the past year [0] : 2) Feeling down, depressed, or hopeless: Not at all (0) [] : No [de-identified] : former smoker x 1 yr 14 yr ago. [Audit-CScore] : 0 [de-identified] : none [de-identified] : salads, less rice and bread [[id number]] : 0

## 2020-06-01 NOTE — ASSESSMENT
[FreeTextEntry1] : This is a 74-year-old female past medical history of hypertension, osteopenia, type 2 diabetes, lumbago, diverticulosis, presenting to the office for medication refills.\par \par Endocrine: History of type 2 diabetes.\par -Hemoglobin A1c 7.6, today.\par -Dietary changes discussed again.\par -Metformin 1000 mg once daily, continue Glipizide ER 5 mg once daily,  Pioglitazone 15 mg qd.\par -Fasting blood sugars goal to be less than 120.\par -Continue blood glucose monitoring daily.\par -Last diabetic eye exam in Feb 2020, no retinopathy.\par \par Cardiovascular: History hypertension.\par -Patient's blood pressure slightly elevated but did not take medications this morning.\par -Continue losartan 1000, hydrochlorothiazide 25 once daily.\par -Continue simvastatin 10 mg at bedtime.\par -Check CMP.\par -Diet and moderate exercise as tolerated has been discussed with the patient\par \par Rheumatology: History of osteopenia, Low vitamin D level\par -Will check Vitamin D level today.\par -Continue calcium and vitamin D supplementation.\par -Last bone density testing done in Sept 2019, no changes, Osteopenia..\par \par Health care maintenance:\par -Patient had a mammogram done in August 2019, BI-RADS 1.\par -GYN Dr Rausch.\par -Patient had a colonoscopy screening in June 2018, f/w Diverticulosis..\par -Patient had bone density testing in Aug 2019 found with osteopenia.\par -Patient deferred influenza vaccine, Pneumonia vaccine and zoster vaccine.\par -Patient had a tetanus vaccine given in April 2017.\par

## 2020-06-02 LAB
25(OH)D3 SERPL-MCNC: 23 NG/ML
ALBUMIN SERPL ELPH-MCNC: 4.3 G/DL
ALP BLD-CCNC: 61 U/L
ALT SERPL-CCNC: 21 U/L
ANION GAP SERPL CALC-SCNC: 17 MMOL/L
AST SERPL-CCNC: 17 U/L
BILIRUB SERPL-MCNC: 0.3 MG/DL
BUN SERPL-MCNC: 14 MG/DL
CALCIUM SERPL-MCNC: 9.8 MG/DL
CHLORIDE SERPL-SCNC: 98 MMOL/L
CO2 SERPL-SCNC: 24 MMOL/L
CREAT SERPL-MCNC: 0.63 MG/DL
GLUCOSE SERPL-MCNC: 360 MG/DL
POTASSIUM SERPL-SCNC: 4.3 MMOL/L
PROT SERPL-MCNC: 6.9 G/DL
SODIUM SERPL-SCNC: 140 MMOL/L

## 2020-07-16 ENCOUNTER — RX RENEWAL (OUTPATIENT)
Age: 75
End: 2020-07-16

## 2020-07-23 ENCOUNTER — RX RENEWAL (OUTPATIENT)
Age: 75
End: 2020-07-23

## 2020-08-21 ENCOUNTER — RX RENEWAL (OUTPATIENT)
Age: 75
End: 2020-08-21

## 2020-08-24 ENCOUNTER — RX RENEWAL (OUTPATIENT)
Age: 75
End: 2020-08-24

## 2020-09-02 ENCOUNTER — APPOINTMENT (OUTPATIENT)
Dept: FAMILY MEDICINE | Facility: CLINIC | Age: 75
End: 2020-09-02
Payer: MEDICARE

## 2020-09-02 ENCOUNTER — RESULT CHARGE (OUTPATIENT)
Age: 75
End: 2020-09-02

## 2020-09-02 VITALS
HEIGHT: 63 IN | DIASTOLIC BLOOD PRESSURE: 80 MMHG | OXYGEN SATURATION: 98 % | HEART RATE: 78 BPM | RESPIRATION RATE: 13 BRPM | SYSTOLIC BLOOD PRESSURE: 134 MMHG | TEMPERATURE: 97.5 F | BODY MASS INDEX: 33.49 KG/M2 | WEIGHT: 189 LBS

## 2020-09-02 LAB — HBA1C MFR BLD HPLC: 7.7

## 2020-09-02 PROCEDURE — 83036 HEMOGLOBIN GLYCOSYLATED A1C: CPT | Mod: QW

## 2020-09-02 PROCEDURE — 99214 OFFICE O/P EST MOD 30 MIN: CPT | Mod: 25

## 2020-09-02 NOTE — PHYSICAL EXAM
[Normal] : normal rate, regular rhythm, normal S1 and S2 and no murmur heard [No Edema] : there was no peripheral edema [de-identified] : obese

## 2020-09-02 NOTE — COUNSELING
[AUDIT-C Screening administered and reviewed] : AUDIT-C Screening administered and reviewed [Benefits of weight loss discussed] : Benefits of weight loss discussed [Encouraged to increase physical activity] : Encouraged to increase physical activity [____ min/wk Activity] : [unfilled] min/wk activity [None] : None [Good understanding] : Patient has a good understanding of lifestyle changes and steps needed to achieve self management goal

## 2020-09-02 NOTE — HISTORY OF PRESENT ILLNESS
[FreeTextEntry1] : medication refills and diabetes check. [de-identified] : This is a 74-year-old female past medical history of hypertension, osteopenia, type 2 diabetes, lumbago, diverticulosis, presenting to the office for medication refills. The patient c/o not being able to sleep for the past 3 weeks though not every day, may be 2-3 days a week and for no apparent reason. Pt has not been taking any OTC medication without consulting with us.

## 2020-09-02 NOTE — HEALTH RISK ASSESSMENT
[No] : In the past 12 months have you used drugs other than those required for medical reasons? No [No falls in past year] : Patient reported no falls in the past year [0] : 2) Feeling down, depressed, or hopeless: Not at all (0) [] : No [de-identified] : former smoker x 1 yr 14 yr ago. [Audit-CScore] : 0 [de-identified] : none [de-identified] : salads, less rice and bread [[id number]] : 0

## 2020-09-02 NOTE — ASSESSMENT
[FreeTextEntry1] : This is a 74-year-old female past medical history of hypertension, osteopenia, type 2 diabetes, lumbago, diverticulosis, presenting to the office for medication refills and diabetes check.\par \par Endocrine: History of type 2 diabetes.\par -Hemoglobin A1c 7.6, today 7.7\par -Dietary changes discussed again.\par -Metformin 1000 mg twice daily, continue Glipizide ER 5 mg once daily,  Pioglitazone 15 mg qd.\par -Fasting blood sugars goal to be less than 120.\par -Continue blood glucose monitoring daily.\par -Last diabetic eye exam in Feb 2020, no retinopathy.\par \par Cardiovascular: History hypertension.\par -Patient's blood pressure slightly elevated but did not take medications this morning.\par -Continue losartan 1000, hydrochlorothiazide 25 once daily.\par -Continue simvastatin 10 mg at bedtime.\par -Diet and moderate exercise as tolerated has been discussed with the patient\par \par Rheumatology: History of osteopenia, Low vitamin D level\par -Continue calcium and vitamin D supplementation.\par -Last bone density testing done in Sept 2019, no changes, Osteopenia.\par \par Neuro: Insomnia\par -Melatonin 3 mg tab QHS prn\par \par Health care maintenance:\par -Patient had a mammogram done in August 2019, BI-RADS 1.\par -GYN Dr Rausch.\par -Patient had a colonoscopy screening in June 2018, f/w Diverticulosis..\par -Patient had bone density testing in Aug 2019 found with osteopenia.\par -Patient deferred influenza vaccine, Pneumonia vaccine and zoster vaccine.\par -Patient had a tetanus vaccine given in April 2017.\par -RTO for CPE after Dec 2nd\par

## 2020-09-02 NOTE — PAST MEDICAL HISTORY
[Postmenopausal] : postmenopausal [Menarche Age ____] : age at menarche was [unfilled] [Total Preg ___] : G[unfilled] [Menopause Age____] : age at menopause was [unfilled] [Live Births ___] : P[unfilled]  [Full Term ___] : Full Term: [unfilled] [Living ___] : Living: [unfilled]

## 2020-10-06 ENCOUNTER — RX RENEWAL (OUTPATIENT)
Age: 75
End: 2020-10-06

## 2020-10-15 ENCOUNTER — RX RENEWAL (OUTPATIENT)
Age: 75
End: 2020-10-15

## 2020-11-02 ENCOUNTER — APPOINTMENT (OUTPATIENT)
Dept: MAMMOGRAPHY | Facility: CLINIC | Age: 75
End: 2020-11-02
Payer: MEDICARE

## 2020-11-02 ENCOUNTER — OUTPATIENT (OUTPATIENT)
Dept: OUTPATIENT SERVICES | Facility: HOSPITAL | Age: 75
LOS: 1 days | End: 2020-11-02
Payer: MEDICARE

## 2020-11-02 ENCOUNTER — RESULT REVIEW (OUTPATIENT)
Age: 75
End: 2020-11-02

## 2020-11-02 DIAGNOSIS — Z12.31 ENCOUNTER FOR SCREENING MAMMOGRAM FOR MALIGNANT NEOPLASM OF BREAST: ICD-10-CM

## 2020-11-02 PROCEDURE — 77067 SCR MAMMO BI INCL CAD: CPT | Mod: 26

## 2020-11-02 PROCEDURE — 77063 BREAST TOMOSYNTHESIS BI: CPT

## 2020-11-02 PROCEDURE — 77063 BREAST TOMOSYNTHESIS BI: CPT | Mod: 26

## 2020-11-02 PROCEDURE — 77067 SCR MAMMO BI INCL CAD: CPT

## 2020-11-05 ENCOUNTER — RX RENEWAL (OUTPATIENT)
Age: 75
End: 2020-11-05

## 2020-11-24 ENCOUNTER — RX RENEWAL (OUTPATIENT)
Age: 75
End: 2020-11-24

## 2020-12-07 ENCOUNTER — APPOINTMENT (OUTPATIENT)
Dept: FAMILY MEDICINE | Facility: CLINIC | Age: 75
End: 2020-12-07
Payer: MEDICARE

## 2020-12-07 ENCOUNTER — RESULT CHARGE (OUTPATIENT)
Age: 75
End: 2020-12-07

## 2020-12-07 VITALS
HEIGHT: 63 IN | WEIGHT: 186 LBS | RESPIRATION RATE: 14 BRPM | DIASTOLIC BLOOD PRESSURE: 80 MMHG | HEART RATE: 71 BPM | BODY MASS INDEX: 32.96 KG/M2 | OXYGEN SATURATION: 96 % | SYSTOLIC BLOOD PRESSURE: 130 MMHG | TEMPERATURE: 97.6 F

## 2020-12-07 DIAGNOSIS — Z00.00 ENCOUNTER FOR GENERAL ADULT MEDICAL EXAMINATION W/OUT ABNORMAL FINDINGS: ICD-10-CM

## 2020-12-07 LAB — HBA1C MFR BLD HPLC: 7.6

## 2020-12-07 PROCEDURE — G0439: CPT

## 2020-12-07 PROCEDURE — 83036 HEMOGLOBIN GLYCOSYLATED A1C: CPT | Mod: QW

## 2020-12-07 PROCEDURE — 99072 ADDL SUPL MATRL&STAF TM PHE: CPT

## 2020-12-07 PROCEDURE — 36415 COLL VENOUS BLD VENIPUNCTURE: CPT

## 2020-12-07 NOTE — HEALTH RISK ASSESSMENT
[Good] : ~his/her~  mood as  good [No] : In the past 12 months have you used drugs other than those required for medical reasons? No [No falls in past year] : Patient reported no falls in the past year [0] : 2) Feeling down, depressed, or hopeless: Not at all (0) [No Retinopathy] : No retinopathy [Patient reported PAP Smear was normal] : Patient reported PAP Smear was normal [HIV test declined] : HIV test declined [Hepatitis C test declined] : Hepatitis C test declined [None] : None [Alone] : lives alone [Employed] : employed [High School] : high school [] :  [# Of Children ___] : has [unfilled] children [Feels Safe at Home] : Feels safe at home [Fully functional (bathing, dressing, toileting, transferring, walking, feeding)] : Fully functional (bathing, dressing, toileting, transferring, walking, feeding) [Fully functional (using the telephone, shopping, preparing meals, housekeeping, doing laundry, using] : Fully functional and needs no help or supervision to perform IADLs (using the telephone, shopping, preparing meals, housekeeping, doing laundry, using transportation, managing medications and managing finances) [Smoke Detector] : smoke detector [Carbon Monoxide Detector] : carbon monoxide detector [Seat Belt] :  uses seat belt [Sunscreen] : uses sunscreen [With Patient/Caregiver] : With Patient/Caregiver [] : No [de-identified] : former smoker x 1 yr 15 yr ago. [Audit-CScore] : 0 [de-identified] : none [de-identified] : salads, less rice and bread [[id number]] : 0 [EyeExamDate] : 01/18 [Change in mental status noted] : No change in mental status noted [Language] : denies difficulty with language [Behavior] : denies difficulty with behavior [Learning/Retaining New Information] : denies difficulty learning/retaining new information [Handling Complex Tasks] : denies difficulty handling complex tasks [Reasoning] : denies difficulty with reasoning [Spatial Ability and Orientation] : denies difficulty with spatial ability and orientation [Sexually Active] : not sexually active [High Risk Behavior] : no high risk behavior [Reports changes in hearing] : Reports no changes in hearing [Reports changes in vision] : Reports no changes in vision [Reports changes in dental health] : Reports no changes in dental health [Guns at Home] : no guns at home [TB Exposure] : is not being exposed to tuberculosis [MammogramDate] : 08/19 [MammogramComments] : BI-RADS 1  [PapSmearDate] : 02/19 [BoneDensityDate] : 09/19 [BoneDensityComments] : Osteopenia [ColonoscopyDate] : 06/2018 [ColonoscopyComments] : Normal, no need for further colonoscopies. 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

## 2020-12-07 NOTE — COUNSELING
[Fall prevention counseling provided] : Fall prevention counseling provided [Adequate lighting] : Adequate lighting [No throw rugs] : No throw rugs [Use proper foot wear] : Use proper foot wear [Behavioral health counseling provided] : Behavioral health counseling provided [Sleep ___ hours/day] : Sleep [unfilled] hours/day [Risk of tobacco use and health benefits of smoking cessation discussed] : Risk of tobacco use and health benefits of smoking cessation discussed [AUDIT-C Screening administered and reviewed] : AUDIT-C Screening administered and reviewed [Benefits of weight loss discussed] : Benefits of weight loss discussed [Encouraged to increase physical activity] : Encouraged to increase physical activity [____ min/wk Activity] : [unfilled] min/wk activity [None] : None [Good understanding] : Patient has a good understanding of lifestyle changes and steps needed to achieve self management goal

## 2020-12-07 NOTE — HISTORY OF PRESENT ILLNESS
[FreeTextEntry1] : Physical exam. [de-identified] : This is a 74-year-old female past medical history of hypertension, osteopenia, type 2 diabetes, lumbago, diverticulosis, presenting to the office for CPE and diabetes follow up.

## 2020-12-07 NOTE — ASSESSMENT
[FreeTextEntry1] : This is a 74-year-old female past medical history of hypertension, osteopenia, type 2 diabetes, lumbago, diverticulosis, presenting to the office for medication refills and diabetes check.\par \par Endocrine: History of type 2 diabetes.\par -Hemoglobin A1c 7.7, today 7.6\par -Metformin 1000 mg twice daily, continue Glipizide ER 5 mg once daily,  Pioglitazone 15 mg qd.\par -Fasting blood sugars goal to be less than 120.\par -Continue blood glucose monitoring daily.\par -Last diabetic eye exam in Feb 2020, no retinopathy.\par \par Cardiovascular: History hypertension.\par -Patient's blood pressure well controlled on current regimen\par -Continue losartan 1000, hydrochlorothiazide 25 once daily.\par -Continue simvastatin 10 mg at bedtime.\par -Diet and moderate exercise as tolerated has been discussed with the patient\par \par Rheumatology: History of osteopenia, Low vitamin D level\par -Continue calcium and vitamin D supplementation.\par -Last bone density testing done in Sept 2019, no changes, Osteopenia.\par \par Neuro: Insomnia\par -Stable on Melatonin 3 mg tab QHS prn\par \par Health care maintenance:\par -Patient had a mammogram done in August 2019, BI-RADS 1.\par -GYN Dr Rausch, will request records..\par -Patient had a colonoscopy screening in June 2018, f/w Diverticulosis..\par -Patient had bone density testing in Aug 2019 found with osteopenia.\par -Patient deferred influenza vaccine, Pneumonia vaccine and zoster vaccine.\par -Patient had a tetanus vaccine given in April 2017.\par

## 2020-12-07 NOTE — PHYSICAL EXAM
[No Acute Distress] : no acute distress [Well Nourished] : well nourished [Well Developed] : well developed [Well-Appearing] : well-appearing [Normal Sclera/Conjunctiva] : normal sclera/conjunctiva [PERRL] : pupils equal round and reactive to light [EOMI] : extraocular movements intact [Normal Outer Ear/Nose] : the outer ears and nose were normal in appearance [Normal Oropharynx] : the oropharynx was normal [No JVD] : no jugular venous distention [No Lymphadenopathy] : no lymphadenopathy [Supple] : supple [Thyroid Normal, No Nodules] : the thyroid was normal and there were no nodules present [No Respiratory Distress] : no respiratory distress  [No Accessory Muscle Use] : no accessory muscle use [Clear to Auscultation] : lungs were clear to auscultation bilaterally [Normal Rate] : normal rate  [Regular Rhythm] : with a regular rhythm [Normal S1, S2] : normal S1 and S2 [No Murmur] : no murmur heard [No Carotid Bruits] : no carotid bruits [No Abdominal Bruit] : a ~M bruit was not heard ~T in the abdomen [No Varicosities] : no varicosities [Pedal Pulses Present] : the pedal pulses are present [No Edema] : there was no peripheral edema [No Palpable Aorta] : no palpable aorta [No Extremity Clubbing/Cyanosis] : no extremity clubbing/cyanosis [Soft] : abdomen soft [Non Tender] : non-tender [Non-distended] : non-distended [No Masses] : no abdominal mass palpated [No HSM] : no HSM [Normal Bowel Sounds] : normal bowel sounds [Normal Posterior Cervical Nodes] : no posterior cervical lymphadenopathy [Normal Anterior Cervical Nodes] : no anterior cervical lymphadenopathy [No CVA Tenderness] : no CVA  tenderness [No Spinal Tenderness] : no spinal tenderness [No Joint Swelling] : no joint swelling [Grossly Normal Strength/Tone] : grossly normal strength/tone [No Rash] : no rash [Coordination Grossly Intact] : coordination grossly intact [No Focal Deficits] : no focal deficits [Normal Gait] : normal gait [Normal Affect] : the affect was normal [Normal Insight/Judgement] : insight and judgment were intact [de-identified] : +  Eye glasses [de-identified] : obese

## 2020-12-09 ENCOUNTER — RX RENEWAL (OUTPATIENT)
Age: 75
End: 2020-12-09

## 2020-12-11 ENCOUNTER — RX RENEWAL (OUTPATIENT)
Age: 75
End: 2020-12-11

## 2020-12-11 LAB
APPEARANCE: ABNORMAL
BACTERIA: ABNORMAL
BILIRUBIN URINE: NEGATIVE
BLOOD URINE: NEGATIVE
COLOR: YELLOW
GLUCOSE QUALITATIVE U: NEGATIVE
HYALINE CASTS: 4 /LPF
KETONES URINE: NEGATIVE
LEUKOCYTE ESTERASE URINE: NEGATIVE
MICROSCOPIC-UA: NORMAL
NITRITE URINE: NEGATIVE
PH URINE: 5.5
PROTEIN URINE: NORMAL
RED BLOOD CELLS URINE: 1 /HPF
SPECIFIC GRAVITY URINE: 1.02
SQUAMOUS EPITHELIAL CELLS: 12 /HPF
UROBILINOGEN URINE: NORMAL
WHITE BLOOD CELLS URINE: 1 /HPF

## 2020-12-14 ENCOUNTER — RX RENEWAL (OUTPATIENT)
Age: 75
End: 2020-12-14

## 2020-12-15 LAB
25(OH)D3 SERPL-MCNC: 26.6 NG/ML
ALBUMIN SERPL ELPH-MCNC: 4.7 G/DL
ALP BLD-CCNC: 63 U/L
ALT SERPL-CCNC: 25 U/L
ANION GAP SERPL CALC-SCNC: 16 MMOL/L
AST SERPL-CCNC: 18 U/L
BASOPHILS # BLD AUTO: 0.02 K/UL
BASOPHILS NFR BLD AUTO: 0.4 %
BILIRUB SERPL-MCNC: 0.2 MG/DL
BUN SERPL-MCNC: 13 MG/DL
CALCIUM SERPL-MCNC: 10.3 MG/DL
CHLORIDE SERPL-SCNC: 102 MMOL/L
CHOLEST SERPL-MCNC: 235 MG/DL
CO2 SERPL-SCNC: 26 MMOL/L
CREAT SERPL-MCNC: 0.81 MG/DL
EOSINOPHIL # BLD AUTO: 0.08 K/UL
EOSINOPHIL NFR BLD AUTO: 1.7 %
GLUCOSE SERPL-MCNC: 181 MG/DL
HCT VFR BLD CALC: 50.2 %
HDLC SERPL-MCNC: 66 MG/DL
HGB BLD-MCNC: 15.8 G/DL
IMM GRANULOCYTES NFR BLD AUTO: 0 %
LDLC SERPL CALC-MCNC: 123 MG/DL
LYMPHOCYTES # BLD AUTO: 2.1 K/UL
LYMPHOCYTES NFR BLD AUTO: 44.1 %
MAN DIFF?: NORMAL
MCHC RBC-ENTMCNC: 29.8 PG
MCHC RBC-ENTMCNC: 31.5 GM/DL
MCV RBC AUTO: 94.7 FL
MONOCYTES # BLD AUTO: 0.45 K/UL
MONOCYTES NFR BLD AUTO: 9.5 %
NEUTROPHILS # BLD AUTO: 2.11 K/UL
NEUTROPHILS NFR BLD AUTO: 44.3 %
NONHDLC SERPL-MCNC: 169 MG/DL
PLATELET # BLD AUTO: 206 K/UL
POTASSIUM SERPL-SCNC: 5 MMOL/L
PROT SERPL-MCNC: 7.3 G/DL
RBC # BLD: 5.3 M/UL
RBC # FLD: 12.6 %
SODIUM SERPL-SCNC: 145 MMOL/L
TRIGL SERPL-MCNC: 229 MG/DL
TSH SERPL-ACNC: 1.49 UIU/ML
WBC # FLD AUTO: 4.76 K/UL

## 2021-01-18 ENCOUNTER — TRANSCRIPTION ENCOUNTER (OUTPATIENT)
Age: 76
End: 2021-01-18

## 2021-02-12 ENCOUNTER — APPOINTMENT (OUTPATIENT)
Dept: FAMILY MEDICINE | Facility: CLINIC | Age: 76
End: 2021-02-12
Payer: MEDICARE

## 2021-02-12 VITALS
HEIGHT: 63 IN | OXYGEN SATURATION: 96 % | DIASTOLIC BLOOD PRESSURE: 80 MMHG | TEMPERATURE: 97.5 F | SYSTOLIC BLOOD PRESSURE: 118 MMHG | WEIGHT: 185 LBS | HEART RATE: 82 BPM | RESPIRATION RATE: 16 BRPM | BODY MASS INDEX: 32.78 KG/M2

## 2021-02-12 DIAGNOSIS — K57.30 DIVERTICULOSIS OF LARGE INTESTINE W/OUT PERFORATION OR ABSCESS W/OUT BLEEDING: ICD-10-CM

## 2021-02-12 DIAGNOSIS — Z86.018 PERSONAL HISTORY OF OTHER BENIGN NEOPLASM: ICD-10-CM

## 2021-02-12 DIAGNOSIS — B35.1 TINEA UNGUIUM: ICD-10-CM

## 2021-02-12 LAB — HBA1C MFR BLD HPLC: 8.2

## 2021-02-12 PROCEDURE — 99212 OFFICE O/P EST SF 10 MIN: CPT | Mod: 25

## 2021-02-12 PROCEDURE — 83036 HEMOGLOBIN GLYCOSYLATED A1C: CPT | Mod: QW

## 2021-02-12 PROCEDURE — 99072 ADDL SUPL MATRL&STAF TM PHE: CPT

## 2021-02-12 RX ORDER — METFORMIN HYDROCHLORIDE 850 MG/1
850 TABLET, COATED ORAL TWICE DAILY
Qty: 180 | Refills: 1 | Status: DISCONTINUED | COMMUNITY
Start: 2020-10-13 | End: 2021-02-12

## 2021-02-12 NOTE — HEALTH RISK ASSESSMENT
[No] : In the past 12 months have you used drugs other than those required for medical reasons? No [No falls in past year] : Patient reported no falls in the past year [0] : 2) Feeling down, depressed, or hopeless: Not at all (0) [] : No [Audit-CScore] : 0 [de-identified] : former smoker x 1 yr 15 yr ago. [de-identified] : none [de-identified] : salads, less rice and bread [[id number]] : 0

## 2021-02-12 NOTE — HISTORY OF PRESENT ILLNESS
[FreeTextEntry1] : diabetes check [de-identified] : This is a 74-year-old female past medical history of hypertension, osteopenia, type 2 diabetes, lumbago, diverticulosis, presenting to the office for diabetes follow up and requests to have handicapped permit filled out.

## 2021-02-12 NOTE — ASSESSMENT
[FreeTextEntry1] : This is a 74-year-old female past medical history of hypertension, osteopenia, type 2 diabetes, lumbago, diverticulosis, presenting to the office for medication refills and diabetes check.\par \par Endocrine: History of type 2 diabetes.\par -Hemoglobin A1c 7.6-->8.2\par -Metformin ER 1000 mg once daily, continue Glipizide ER 5 mg once daily,  Pioglitazone 15 mg qd, add Farxiga 10 mg daily\par -Fasting blood sugars goal to be less than 120.\par -Continue blood glucose monitoring daily.\par -Last diabetic eye exam in Feb 2020, no retinopathy, referral given\par \par Cardiovascular: History hypertension.\par -Patient's blood pressure well controlled on current regimen\par -Continue losartan 100 mg, hydrochlorothiazide 25 mg once daily.\par -Continue simvastatin 10 mg at bedtime.\par -Diet and moderate exercise as tolerated has been discussed with the patient\par \par Rheumatology: History of osteopenia, Low vitamin D level\par -Continue calcium and vitamin D supplementation.\par -Last bone density testing done in Sept 2019, no changes, Osteopenia.\par \par Neuro: Insomnia\par -Stable on Melatonin 3 mg tab QHS prn\par \par Health care maintenance:\par -Patient had a mammogram done in Nov 2020, BI-RADS 1.\par -GYN Dr Rausch.\par -Patient had a colonoscopy screening in June 2018, f/w Diverticulosis..\par -Patient had bone density testing in Aug 2019 found with osteopenia.\par -Patient deferred influenza vaccine, Pneumonia vaccine and zoster vaccine.\par -Patient had a tetanus vaccine given in April 2017.\par

## 2021-02-18 ENCOUNTER — RX RENEWAL (OUTPATIENT)
Age: 76
End: 2021-02-18

## 2021-02-22 ENCOUNTER — RX RENEWAL (OUTPATIENT)
Age: 76
End: 2021-02-22

## 2021-03-19 ENCOUNTER — APPOINTMENT (OUTPATIENT)
Dept: FAMILY MEDICINE | Facility: CLINIC | Age: 76
End: 2021-03-19
Payer: MEDICARE

## 2021-03-19 VITALS
RESPIRATION RATE: 12 BRPM | TEMPERATURE: 98.2 F | SYSTOLIC BLOOD PRESSURE: 120 MMHG | BODY MASS INDEX: 32.07 KG/M2 | HEART RATE: 86 BPM | WEIGHT: 181 LBS | HEIGHT: 63 IN | DIASTOLIC BLOOD PRESSURE: 76 MMHG | OXYGEN SATURATION: 98 %

## 2021-03-19 PROCEDURE — 99213 OFFICE O/P EST LOW 20 MIN: CPT | Mod: 25

## 2021-03-19 PROCEDURE — 36415 COLL VENOUS BLD VENIPUNCTURE: CPT

## 2021-03-19 PROCEDURE — 99072 ADDL SUPL MATRL&STAF TM PHE: CPT

## 2021-03-19 PROCEDURE — 83036 HEMOGLOBIN GLYCOSYLATED A1C: CPT | Mod: QW

## 2021-03-19 NOTE — HEALTH RISK ASSESSMENT
[No] : In the past 12 months have you used drugs other than those required for medical reasons? No [No falls in past year] : Patient reported no falls in the past year [0] : 2) Feeling down, depressed, or hopeless: Not at all (0) [] : No [de-identified] : former smoker x 1 yr 15 yr ago. [Audit-CScore] : 0 [de-identified] : none [de-identified] : salads, less rice and bread [[id number]] : 0

## 2021-03-19 NOTE — HISTORY OF PRESENT ILLNESS
[FreeTextEntry1] : diabetes check [de-identified] : This is a 74-year-old female past medical history of hypertension, osteopenia, type 2 diabetes, lumbago, diverticulosis, presenting to the office for blood work follow up of elevated lipids. Pt offers no acute complains.

## 2021-03-19 NOTE — ASSESSMENT
[FreeTextEntry1] : This is a 74-year-old female past medical history of hypertension, osteopenia, type 2 diabetes, lumbago, diverticulosis, presenting to the office for medication refills and diabetes check.\par \par Endocrine: History of type 2 diabetes.\par -Hemoglobin A1c 7.6-->8.2\par -Metformin ER 1000 mg once daily, continue Glipizide ER 5 mg once daily,  Pioglitazone 15 mg qd, Farxiga 10 mg daily\par -Fasting blood sugars goal to be less than 120.\par -Continue blood glucose monitoring daily.\par -Last diabetic eye exam in Feb 2020, no retinopathy, referral given\par \par Cardiovascular: History hypertension.\par -Patient's blood pressure well controlled on current regimen\par -Continue losartan 100 mg, hydrochlorothiazide 25 mg once daily.\par -Continue simvastatin 40 mg at bedtime.\par -Diet and moderate exercise as tolerated has been discussed with the patient\par \par Rheumatology: History of osteopenia, Low vitamin D level\par -Continue calcium and vitamin D supplementation.\par -Last bone density testing done in Sept 2019, no changes, Osteopenia.\par \par Neuro: Insomnia\par -Stable on Melatonin 3 mg tab QHS prn\par \par Health care maintenance:\par -Patient had a mammogram done in Nov 2020, BI-RADS 1.\par -GYN Dr Rausch.\par -Patient had a colonoscopy screening in June 2018, f/w Diverticulosis..\par -Patient had bone density testing in Aug 2019 found with osteopenia.\par -Patient deferred influenza vaccine, Pneumonia vaccine and zoster vaccine.\par -Patient had a tetanus vaccine given in April 2017.\par -Ophthalmology referral given, has appointment next week.\par

## 2021-03-29 LAB
ALBUMIN SERPL ELPH-MCNC: 4.7 G/DL
ALP BLD-CCNC: 64 U/L
ALT SERPL-CCNC: 23 U/L
ANION GAP SERPL CALC-SCNC: 16 MMOL/L
AST SERPL-CCNC: 20 U/L
BASOPHILS # BLD AUTO: 0.02 K/UL
BASOPHILS NFR BLD AUTO: 0.5 %
BILIRUB SERPL-MCNC: 0.4 MG/DL
BUN SERPL-MCNC: 16 MG/DL
CALCIUM SERPL-MCNC: 10.1 MG/DL
CHLORIDE SERPL-SCNC: 103 MMOL/L
CO2 SERPL-SCNC: 25 MMOL/L
CREAT SERPL-MCNC: 0.72 MG/DL
EOSINOPHIL # BLD AUTO: 0.07 K/UL
EOSINOPHIL NFR BLD AUTO: 1.7 %
GLUCOSE SERPL-MCNC: 194 MG/DL
HCT VFR BLD CALC: 48.2 %
HGB BLD-MCNC: 15.6 G/DL
IMM GRANULOCYTES NFR BLD AUTO: 0.2 %
LYMPHOCYTES # BLD AUTO: 2.2 K/UL
LYMPHOCYTES NFR BLD AUTO: 52.4 %
MAN DIFF?: NORMAL
MCHC RBC-ENTMCNC: 29.5 PG
MCHC RBC-ENTMCNC: 32.4 GM/DL
MCV RBC AUTO: 91.1 FL
MONOCYTES # BLD AUTO: 0.36 K/UL
MONOCYTES NFR BLD AUTO: 8.6 %
NEUTROPHILS # BLD AUTO: 1.54 K/UL
NEUTROPHILS NFR BLD AUTO: 36.6 %
PLATELET # BLD AUTO: 191 K/UL
POTASSIUM SERPL-SCNC: 4.3 MMOL/L
PROT SERPL-MCNC: 7.4 G/DL
RBC # BLD: 5.29 M/UL
RBC # FLD: 12.6 %
SODIUM SERPL-SCNC: 145 MMOL/L
WBC # FLD AUTO: 4.2 K/UL

## 2021-03-30 LAB
CHOLEST SERPL-MCNC: 183 MG/DL
ESTIMATED AVERAGE GLUCOSE: 194 MG/DL
HBA1C MFR BLD HPLC: 8.4 %
HDLC SERPL-MCNC: 60 MG/DL
LDLC SERPL CALC-MCNC: 82 MG/DL
NONHDLC SERPL-MCNC: 122 MG/DL
TRIGL SERPL-MCNC: 202 MG/DL

## 2021-04-22 ENCOUNTER — RX RENEWAL (OUTPATIENT)
Age: 76
End: 2021-04-22

## 2021-04-26 ENCOUNTER — RX RENEWAL (OUTPATIENT)
Age: 76
End: 2021-04-26

## 2021-04-27 ENCOUNTER — RX RENEWAL (OUTPATIENT)
Age: 76
End: 2021-04-27

## 2021-05-04 ENCOUNTER — RX RENEWAL (OUTPATIENT)
Age: 76
End: 2021-05-04

## 2021-06-01 ENCOUNTER — RX RENEWAL (OUTPATIENT)
Age: 76
End: 2021-06-01

## 2021-06-23 ENCOUNTER — RX RENEWAL (OUTPATIENT)
Age: 76
End: 2021-06-23

## 2021-06-24 ENCOUNTER — RX RENEWAL (OUTPATIENT)
Age: 76
End: 2021-06-24

## 2021-07-07 ENCOUNTER — RX RENEWAL (OUTPATIENT)
Age: 76
End: 2021-07-07

## 2021-07-19 ENCOUNTER — APPOINTMENT (OUTPATIENT)
Dept: FAMILY MEDICINE | Facility: CLINIC | Age: 76
End: 2021-07-19
Payer: MEDICARE

## 2021-07-19 ENCOUNTER — LABORATORY RESULT (OUTPATIENT)
Age: 76
End: 2021-07-19

## 2021-07-19 VITALS
WEIGHT: 178 LBS | BODY MASS INDEX: 31.54 KG/M2 | TEMPERATURE: 97 F | SYSTOLIC BLOOD PRESSURE: 118 MMHG | HEART RATE: 69 BPM | DIASTOLIC BLOOD PRESSURE: 78 MMHG | RESPIRATION RATE: 16 BRPM | HEIGHT: 63 IN | OXYGEN SATURATION: 97 %

## 2021-07-19 PROCEDURE — 83036 HEMOGLOBIN GLYCOSYLATED A1C: CPT | Mod: QW

## 2021-07-19 PROCEDURE — 99213 OFFICE O/P EST LOW 20 MIN: CPT | Mod: 25

## 2021-07-19 PROCEDURE — 36415 COLL VENOUS BLD VENIPUNCTURE: CPT

## 2021-07-19 NOTE — HEALTH RISK ASSESSMENT
[No] : In the past 12 months have you used drugs other than those required for medical reasons? No [No falls in past year] : Patient reported no falls in the past year [0] : 2) Feeling down, depressed, or hopeless: Not at all (0) [] : No [de-identified] : former smoker x 1 yr 15 yr ago. [Audit-CScore] : 0 [de-identified] : none [de-identified] : salads, less rice and bread [[id number]] : 0

## 2021-07-19 NOTE — ASSESSMENT
[FreeTextEntry1] : This is a 74-year-old female past medical history of hypertension, osteopenia, type 2 diabetes, lumbago, diverticulosis, presenting to the office c/o RIGHT LE numbness.\par \par MSK/NEURO: RIGHT LE numbness, lumbago\par -Medrol dose pack, watch blood sugars while on steroids\par -If symptoms do not resolve, RTO\par -Check ACE\par \par Endocrine: History of type 2 diabetes.\par -Hemoglobin A1c 7.6-->8.2 -->7.9\par -Metformin ER 1000 mg once daily, continue Glipizide ER 5 mg once daily,  Pioglitazone 15 mg qd, Farxiga 10 mg daily\par -Fasting blood sugars goal to be less than 120.\par -Continue blood glucose monitoring daily.\par -Last diabetic eye exam March 2021, no retinopathy.\par \par Cardiovascular: History hypertension.\par -Patient's blood pressure well controlled on current regimen\par -Continue losartan 100 mg, hydrochlorothiazide 25 mg once daily.\par -Continue simvastatin 40 mg at bedtime.\par -Diet and moderate exercise as tolerated has been discussed with the patient\par \par Rheumatology: History of osteopenia, Low vitamin D level\par -Continue calcium and vitamin D supplementation.\par -Last bone density testing done in Sept 2019, no changes, Osteopenia.\par \par HEME: Elevated Hgb\par -Repeat CBC today\par \par Neuro: Insomnia\par -Stable on Melatonin 3 mg tab QHS prn\par \par Health care maintenance:\par -Patient had a mammogram done in Nov 2020, BI-RADS 1.\par -GYN Dr Rausch.\par -Colonoscopy screening in June 2018, f/w Diverticulosis..\par -Bone density testing in Aug 2019 found with osteopenia.\par -Patient deferred influenza vaccine, Pneumonia vaccine and zoster vaccine.\par -TDaP vaccine given in April 2017.\par -Ophthalmology 03/21, no

## 2021-07-19 NOTE — PHYSICAL EXAM
[Normal] : normal gait, coordination grossly intact, no focal deficits and deep tendon reflexes were 2+ and symmetric [de-identified] : There is pinpoint spinal tenderness at L4-L5 radiating to the right side. [de-identified] : Hypersensibility of anterior proximal RIGHT LE to light sensation, no signs of trauma, palpable peripheral pulses.

## 2021-07-30 LAB
ACE BLD-CCNC: 36 U/L
BASOPHILS # BLD AUTO: 0.04 K/UL
BASOPHILS NFR BLD AUTO: 0.9 %
EOSINOPHIL # BLD AUTO: 0.08 K/UL
EOSINOPHIL NFR BLD AUTO: 1.7 %
HBA1C MFR BLD HPLC: 7.9
HCT VFR BLD CALC: 47.3 %
HGB BLD-MCNC: 15 G/DL
LYMPHOCYTES # BLD AUTO: 1.59 K/UL
LYMPHOCYTES NFR BLD AUTO: 33.9 %
MAN DIFF?: NORMAL
MCHC RBC-ENTMCNC: 29.4 PG
MCHC RBC-ENTMCNC: 31.7 GM/DL
MCV RBC AUTO: 92.6 FL
MONOCYTES # BLD AUTO: 0.37 K/UL
MONOCYTES NFR BLD AUTO: 7.8 %
NEUTROPHILS # BLD AUTO: 2.5 K/UL
NEUTROPHILS NFR BLD AUTO: 53.1 %
PLATELET # BLD AUTO: 193 K/UL
RBC # BLD: 5.11 M/UL
RBC # FLD: 12.7 %
WBC # FLD AUTO: 4.7 K/UL

## 2021-08-31 ENCOUNTER — APPOINTMENT (OUTPATIENT)
Dept: FAMILY MEDICINE | Facility: CLINIC | Age: 76
End: 2021-08-31
Payer: MEDICARE

## 2021-08-31 ENCOUNTER — RESULT REVIEW (OUTPATIENT)
Age: 76
End: 2021-08-31

## 2021-08-31 VITALS
OXYGEN SATURATION: 96 % | HEART RATE: 79 BPM | WEIGHT: 173 LBS | DIASTOLIC BLOOD PRESSURE: 80 MMHG | BODY MASS INDEX: 30.65 KG/M2 | SYSTOLIC BLOOD PRESSURE: 120 MMHG | RESPIRATION RATE: 16 BRPM | TEMPERATURE: 96.9 F | HEIGHT: 63 IN

## 2021-08-31 DIAGNOSIS — R20.0 ANESTHESIA OF SKIN: ICD-10-CM

## 2021-08-31 PROCEDURE — 99213 OFFICE O/P EST LOW 20 MIN: CPT

## 2021-08-31 RX ORDER — METHYLPREDNISOLONE 4 MG/1
4 TABLET ORAL
Qty: 1 | Refills: 0 | Status: COMPLETED | COMMUNITY
Start: 2021-07-19 | End: 2021-08-31

## 2021-08-31 NOTE — PHYSICAL EXAM
[Normal] : normal gait, coordination grossly intact, no focal deficits and deep tendon reflexes were 2+ and symmetric [de-identified] : There is pinpoint spinal tenderness at L4-L5 radiating to the right side. [de-identified] : Hyposensibility of anterior proximal RIGHT LE to light sensation, no signs of trauma, palpable peripheral pulses.

## 2021-08-31 NOTE — ASSESSMENT
[FreeTextEntry1] : This is a 74-year-old female past medical history of hypertension, osteopenia, type 2 diabetes, lumbago, diverticulosis, presenting to the office c/o continued RIGHT LE numbness.\par \par MSK/NEURO: RIGHT LE numbness, lumbago\par -Start Gabapentin 100 mg TID, side effects discussed with pt.\par -If no improvement, will get Neurology referral.\par \par Endocrine: History of type 2 diabetes.\par -Hemoglobin A1c 7.6-->8.2 -->7.9\par -Metformin ER 1000 mg once daily, Glipizide ER 5 mg once daily, Pioglitazone 15 mg qd, Farxiga 10 mg daily\par -Fasting blood sugars goal to be less than 120.\par -Last diabetic eye exam March 2021, no retinopathy.\par \par Cardiovascular: History hypertension.\par -Patient's blood pressure well controlled on current regimen\par -Continue losartan 100 mg, hydrochlorothiazide 25 mg once daily.\par -Continue simvastatin 40 mg at bedtime.\par \par Rheumatology: History of osteopenia, Low vitamin D level\par -Continue calcium and vitamin D supplementation.\par -Last bone density testing done in Sept 2019, no changes, Osteopenia, referral given last visit, still not done.\par \par Neuro: Insomnia\par -Stable on Melatonin 3 mg tab QHS prn\par \par Health care maintenance:\par -Patient had a mammogram done in Nov 2020, BI-RADS 1.\par -GYN Dr Rausch.\par -Colonoscopy screening in June 2018, f/w Diverticulosis..\par -Bone density testing in Aug 2019 found with osteopenia, referral given.\par -Patient deferred influenza vaccine, Pneumonia vaccine and zoster vaccine.\par -TDaP vaccine given in April 2017.\par -Ophthalmology 03/21, no

## 2021-08-31 NOTE — HEALTH RISK ASSESSMENT
[No] : In the past 12 months have you used drugs other than those required for medical reasons? No [No falls in past year] : Patient reported no falls in the past year [0] : 2) Feeling down, depressed, or hopeless: Not at all (0) [] : No [de-identified] : former smoker x 1 yr 15 yr ago. [Audit-CScore] : 0 [de-identified] : none [de-identified] : salads, less rice and bread [[id number]] : 0

## 2021-08-31 NOTE — HISTORY OF PRESENT ILLNESS
[FreeTextEntry1] : numbness of leg [de-identified] : This is a 75-year-old female past medical history of hypertension, osteopenia, type 2 diabetes, lumbago, diverticulosis, presenting to the office complaining of still numbness of the front RIGHT leg from the groin to the knee, described at times as crawling sensation, improved slightly after course of steroids.

## 2021-09-13 ENCOUNTER — OUTPATIENT (OUTPATIENT)
Dept: OUTPATIENT SERVICES | Facility: HOSPITAL | Age: 76
LOS: 1 days | End: 2021-09-13
Payer: MEDICARE

## 2021-09-13 DIAGNOSIS — M54.5 LOW BACK PAIN: ICD-10-CM

## 2021-09-13 PROCEDURE — 72100 X-RAY EXAM L-S SPINE 2/3 VWS: CPT | Mod: 26

## 2021-11-01 ENCOUNTER — APPOINTMENT (OUTPATIENT)
Dept: FAMILY MEDICINE | Facility: CLINIC | Age: 76
End: 2021-11-01
Payer: MEDICARE

## 2021-11-01 VITALS
SYSTOLIC BLOOD PRESSURE: 124 MMHG | RESPIRATION RATE: 16 BRPM | HEIGHT: 63 IN | OXYGEN SATURATION: 98 % | WEIGHT: 175 LBS | DIASTOLIC BLOOD PRESSURE: 80 MMHG | TEMPERATURE: 97.9 F | HEART RATE: 86 BPM | BODY MASS INDEX: 31.01 KG/M2

## 2021-11-01 PROCEDURE — 83036 HEMOGLOBIN GLYCOSYLATED A1C: CPT | Mod: QW

## 2021-11-01 PROCEDURE — 99213 OFFICE O/P EST LOW 20 MIN: CPT | Mod: 25

## 2021-11-01 RX ORDER — AMOXICILLIN 500 MG/1
500 CAPSULE ORAL
Qty: 30 | Refills: 0 | Status: COMPLETED | COMMUNITY
Start: 2021-10-11

## 2021-11-01 NOTE — HEALTH RISK ASSESSMENT
[No] : In the past 12 months have you used drugs other than those required for medical reasons? No [No falls in past year] : Patient reported no falls in the past year [0] : 2) Feeling down, depressed, or hopeless: Not at all (0) [] : No [de-identified] : former smoker x 1 yr 15 yr ago. [Audit-CScore] : 0 [de-identified] : none [de-identified] : salads, less rice and bread [[id number]] : 0

## 2021-11-01 NOTE — PHYSICAL EXAM
[Normal] : normal gait, coordination grossly intact, no focal deficits and deep tendon reflexes were 2+ and symmetric [de-identified] : There is pinpoint spinal tenderness at L4-L5 radiating to the right side. [de-identified] : Hyposensibility of anterior proximal RIGHT LE to light sensation, no signs of trauma, palpable peripheral pulses.

## 2021-11-01 NOTE — ASSESSMENT
[FreeTextEntry1] : This is a 74-year-old female past medical history of hypertension, osteopenia, type 2 diabetes, lumbago, diverticulosis, presenting to the office c/o continued RIGHT LE numbness.\par \par MSK/NEURO: RIGHT LE numbness, lumbago\par -Start Gabapentin 100 mg TID, side effects discussed with pt.\par -If no improvement, will get Neurology referral.\par \par Endocrine: History of type 2 diabetes.\par -Hemoglobin A1c 7.6-->8.2 -->7.9--> 7.8\par -Metformin ER 1000 mg once daily, Glipizide ER 5 mg once daily, Pioglitazone 15 mg qd, Farxiga 10 mg daily\par -Fasting blood sugars goal to be less than 120.\par -Last diabetic eye exam Sept 2021, no retinopathy.\par \par Cardiovascular: History hypertension.\par -Continue losartan 100 mg, hydrochlorothiazide 25 mg once daily.\par -Continue simvastatin 40 mg at bedtime.\par \par Rheumatology: History of osteopenia, Low vitamin D level\par -Continue calcium and vitamin D supplementation.\par -Last bone density testing done in Sept 2019, no changes, Osteopenia, referral given last visit, still not done.\par \par Neuro: Insomnia\par -Stable on Melatonin 3 mg tab QHS prn\par \par Health care maintenance:\par -Patient had a mammogram done in Nov 2020, BI-RADS 1.\par -GYN Dr Rausch.\par -Colonoscopy screening in June 2018, f/w Diverticulosis..\par -Bone density testing in Aug 2019 found with osteopenia, referral given.\par -Patient deferred influenza vaccine, Pneumonia vaccine and zoster vaccine.\par -TDaP vaccine given in April 2017.\par -Ophthalmology 03/21, no DR.\par -Declined Flu vaccine\par -Covid vaccination completed 5/14/21, 6/4/21, PFIZER

## 2021-11-01 NOTE — HISTORY OF PRESENT ILLNESS
[FreeTextEntry1] : numbness of leg [de-identified] : This is a 75-year-old female past medical history of hypertension, osteopenia, type 2 diabetes, lumbago, diverticulosis, presenting to the office for diabetes check. Pt offers no acute complains

## 2021-11-08 ENCOUNTER — APPOINTMENT (OUTPATIENT)
Dept: RADIOLOGY | Facility: CLINIC | Age: 76
End: 2021-11-08
Payer: MEDICARE

## 2021-11-08 ENCOUNTER — RESULT REVIEW (OUTPATIENT)
Age: 76
End: 2021-11-08

## 2021-11-08 ENCOUNTER — OUTPATIENT (OUTPATIENT)
Dept: OUTPATIENT SERVICES | Facility: HOSPITAL | Age: 76
LOS: 1 days | End: 2021-11-08
Payer: MEDICARE

## 2021-11-08 ENCOUNTER — APPOINTMENT (OUTPATIENT)
Dept: MAMMOGRAPHY | Facility: CLINIC | Age: 76
End: 2021-11-08
Payer: MEDICARE

## 2021-11-08 DIAGNOSIS — Z00.8 ENCOUNTER FOR OTHER GENERAL EXAMINATION: ICD-10-CM

## 2021-11-08 PROCEDURE — 77067 SCR MAMMO BI INCL CAD: CPT

## 2021-11-08 PROCEDURE — 77080 DXA BONE DENSITY AXIAL: CPT | Mod: 26

## 2021-11-08 PROCEDURE — 77063 BREAST TOMOSYNTHESIS BI: CPT | Mod: 26

## 2021-11-08 PROCEDURE — 77063 BREAST TOMOSYNTHESIS BI: CPT

## 2021-11-08 PROCEDURE — 77067 SCR MAMMO BI INCL CAD: CPT | Mod: 26

## 2021-11-08 PROCEDURE — 77080 DXA BONE DENSITY AXIAL: CPT

## 2021-12-13 ENCOUNTER — RX RENEWAL (OUTPATIENT)
Age: 76
End: 2021-12-13

## 2021-12-21 ENCOUNTER — APPOINTMENT (OUTPATIENT)
Dept: FAMILY MEDICINE | Facility: CLINIC | Age: 76
End: 2021-12-21
Payer: MEDICARE

## 2021-12-21 VITALS
RESPIRATION RATE: 16 BRPM | SYSTOLIC BLOOD PRESSURE: 126 MMHG | TEMPERATURE: 98 F | BODY MASS INDEX: 30.3 KG/M2 | DIASTOLIC BLOOD PRESSURE: 70 MMHG | WEIGHT: 171 LBS | OXYGEN SATURATION: 97 % | HEART RATE: 83 BPM | HEIGHT: 63 IN

## 2021-12-21 DIAGNOSIS — F17.211 NICOTINE DEPENDENCE, CIGARETTES, IN REMISSION: ICD-10-CM

## 2021-12-21 DIAGNOSIS — D22.9 MELANOCYTIC NEVI, UNSPECIFIED: ICD-10-CM

## 2021-12-21 DIAGNOSIS — Z23 ENCOUNTER FOR IMMUNIZATION: ICD-10-CM

## 2021-12-21 PROCEDURE — 36415 COLL VENOUS BLD VENIPUNCTURE: CPT

## 2021-12-21 PROCEDURE — 0003A: CPT

## 2021-12-21 PROCEDURE — G0439: CPT

## 2021-12-21 NOTE — ASSESSMENT
[FreeTextEntry1] : This is a 74-year-old female past medical history of hypertension, osteopenia, type 2 diabetes, lumbago, diverticulosis, presenting to the office for CPE\par \par MSK/NEURO: RIGHT LE numbness, lumbago\par -Increase Gabapentin 100 mg TID to 2 tabs.\par -If no improvement, will get Neurology referral.\par \par Endocrine: History of type 2 diabetes.\par -Hemoglobin A1c 7.6-->8.2 -->7.9--> 7.8\par -Metformin ER 1000 mg once daily, Glipizide ER 5 mg once daily, Pioglitazone 15 mg qd, Farxiga 10 mg daily\par -Fasting blood sugars goal to be less than 120.\par -Last diabetic eye exam Sept 2021, no retinopathy.\par \par Cardiovascular: History hypertension.\par -Continue losartan 100 mg, hydrochlorothiazide 25 mg once daily.\par -Continue simvastatin 40 mg at bedtime.\par \par Rheumatology: History of osteopenia, Low vitamin D level\par -Continue calcium and vitamin D supplementation.\par -Last bone density testing done Nov 2021, no changes, Osteopenia.\par \par Neuro: Insomnia\par -Stable on Melatonin 3 mg tab QHS prn\par \par Health care maintenance:\par -Patient had a mammogram done in Nov 2021, BI-RADS 1.\par -GYN Dr Rausch.\par -Colonoscopy screening in June 2018, f/w Diverticulosis..\par -Bone density testing Nov 2021 found with osteopenia.\par -Patient deferred influenza vaccine, Pneumonia vaccine and zoster vaccine.\par -TDaP vaccine given in April 2017.\par -Ophthalmology 03/21, no DR.\par -Declined Flu vaccine\par -Covid vaccination completed 5/14/21, 6/4/21, PFIZER booster today in house.

## 2021-12-21 NOTE — COUNSELING
Arrived to unit at 2250. Pt minimally responsive. Responds to pain. Loud yelling and crying out when repositioned. Have been medicating pt with ativan and dilaudid prior to turns and pt seems to tolerate repositioning better and appears to rest peacefully in between care. F/C. Legs very contracted. Dressings on leg wounds dry and intact. Will monitor   [Fall prevention counseling provided] : Fall prevention counseling provided [Adequate lighting] : Adequate lighting [No throw rugs] : No throw rugs [Use proper foot wear] : Use proper foot wear [Behavioral health counseling provided] : Behavioral health counseling provided [Sleep ___ hours/day] : Sleep [unfilled] hours/day [AUDIT-C Screening administered and reviewed] : AUDIT-C Screening administered and reviewed [Benefits of weight loss discussed] : Benefits of weight loss discussed [Encouraged to increase physical activity] : Encouraged to increase physical activity [____ min/wk Activity] : [unfilled] min/wk activity [None] : None [Good understanding] : Patient has a good understanding of lifestyle changes and steps needed to achieve self management goal

## 2021-12-21 NOTE — HEALTH RISK ASSESSMENT
[Good] : ~his/her~  mood as  good [Former] : Former [No] : In the past 12 months have you used drugs other than those required for medical reasons? No [No falls in past year] : Patient reported no falls in the past year [0] : 2) Feeling down, depressed, or hopeless: Not at all (0) [PHQ-2 Negative - No further assessment needed] : PHQ-2 Negative - No further assessment needed [No Retinopathy] : No retinopathy [Patient reported PAP Smear was normal] : Patient reported PAP Smear was normal [HIV test declined] : HIV test declined [Hepatitis C test declined] : Hepatitis C test declined [None] : None [Alone] : lives alone [Employed] : employed [High School] : high school [] :  [# Of Children ___] : has [unfilled] children [Feels Safe at Home] : Feels safe at home [Fully functional (bathing, dressing, toileting, transferring, walking, feeding)] : Fully functional (bathing, dressing, toileting, transferring, walking, feeding) [Fully functional (using the telephone, shopping, preparing meals, housekeeping, doing laundry, using] : Fully functional and needs no help or supervision to perform IADLs (using the telephone, shopping, preparing meals, housekeeping, doing laundry, using transportation, managing medications and managing finances) [Smoke Detector] : smoke detector [Carbon Monoxide Detector] : carbon monoxide detector [Seat Belt] :  uses seat belt [Sunscreen] : uses sunscreen [Reviewed updated] : Reviewed, updated [Aggressive treatment] : aggressive treatment [de-identified] : former smoker x 1 yr 15 yr ago. [Audit-CScore] : 0 [de-identified] : none [de-identified] : salads, less rice and bread [[id number]] : 0 [EyeExamDate] : 01/18 [Change in mental status noted] : No change in mental status noted [Language] : denies difficulty with language [Behavior] : denies difficulty with behavior [Learning/Retaining New Information] : denies difficulty learning/retaining new information [Handling Complex Tasks] : denies difficulty handling complex tasks [Reasoning] : denies difficulty with reasoning [Spatial Ability and Orientation] : denies difficulty with spatial ability and orientation [Sexually Active] : not sexually active [High Risk Behavior] : no high risk behavior [Reports changes in hearing] : Reports no changes in hearing [Reports changes in vision] : Reports no changes in vision [Reports changes in dental health] : Reports no changes in dental health [Guns at Home] : no guns at home [TB Exposure] : is not being exposed to tuberculosis [MammogramDate] : 11/21 [MammogramComments] : BI-RADS 1  [PapSmearDate] : 02/19 [BoneDensityDate] : 11/21 [BoneDensityComments] : Osteopenia [ColonoscopyDate] : 06/2018 [ColonoscopyComments] : Normal, no need for further colonoscopies. 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

## 2021-12-21 NOTE — PHYSICAL EXAM
[No Acute Distress] : no acute distress [Well Nourished] : well nourished [Well Developed] : well developed [Well-Appearing] : well-appearing [Normal Sclera/Conjunctiva] : normal sclera/conjunctiva [PERRL] : pupils equal round and reactive to light [EOMI] : extraocular movements intact [Normal Outer Ear/Nose] : the outer ears and nose were normal in appearance [Normal Oropharynx] : the oropharynx was normal [No JVD] : no jugular venous distention [No Lymphadenopathy] : no lymphadenopathy [Supple] : supple [Thyroid Normal, No Nodules] : the thyroid was normal and there were no nodules present [No Respiratory Distress] : no respiratory distress  [No Accessory Muscle Use] : no accessory muscle use [Clear to Auscultation] : lungs were clear to auscultation bilaterally [Normal Rate] : normal rate  [Regular Rhythm] : with a regular rhythm [Normal S1, S2] : normal S1 and S2 [No Murmur] : no murmur heard [No Carotid Bruits] : no carotid bruits [No Abdominal Bruit] : a ~M bruit was not heard ~T in the abdomen [No Varicosities] : no varicosities [Pedal Pulses Present] : the pedal pulses are present [No Edema] : there was no peripheral edema [No Palpable Aorta] : no palpable aorta [No Extremity Clubbing/Cyanosis] : no extremity clubbing/cyanosis [Soft] : abdomen soft [Non Tender] : non-tender [Non-distended] : non-distended [No Masses] : no abdominal mass palpated [No HSM] : no HSM [Normal Bowel Sounds] : normal bowel sounds [Normal Posterior Cervical Nodes] : no posterior cervical lymphadenopathy [Normal Anterior Cervical Nodes] : no anterior cervical lymphadenopathy [No CVA Tenderness] : no CVA  tenderness [No Spinal Tenderness] : no spinal tenderness [No Joint Swelling] : no joint swelling [Grossly Normal Strength/Tone] : grossly normal strength/tone [No Rash] : no rash [Coordination Grossly Intact] : coordination grossly intact [No Focal Deficits] : no focal deficits [Normal Gait] : normal gait [Deep Tendon Reflexes (DTR)] : deep tendon reflexes were 2+ and symmetric [Normal Affect] : the affect was normal [Normal Insight/Judgement] : insight and judgment were intact [de-identified] : LEFT Zygomatic arch mole

## 2021-12-21 NOTE — HISTORY OF PRESENT ILLNESS
[FreeTextEntry1] : Physical exam. [de-identified] : This is a 76-year-old female past medical history of hypertension, osteopenia, type 2 diabetes, lumbago, diverticulosis, presenting to the office for Wellness exam and Covid Booster vaccine. Pt offers no acute complains

## 2021-12-29 LAB
25(OH)D3 SERPL-MCNC: 22.1 NG/ML
ALBUMIN SERPL ELPH-MCNC: 4.4 G/DL
ALP BLD-CCNC: 59 U/L
ALT SERPL-CCNC: 18 U/L
ANION GAP SERPL CALC-SCNC: 20 MMOL/L
AST SERPL-CCNC: 16 U/L
BASOPHILS # BLD AUTO: 0.01 K/UL
BASOPHILS NFR BLD AUTO: 0.2 %
BILIRUB SERPL-MCNC: 0.6 MG/DL
BUN SERPL-MCNC: 20 MG/DL
CALCIUM SERPL-MCNC: 9.9 MG/DL
CHLORIDE SERPL-SCNC: 97 MMOL/L
CHOLEST SERPL-MCNC: 190 MG/DL
CO2 SERPL-SCNC: 22 MMOL/L
CREAT SERPL-MCNC: 0.76 MG/DL
EOSINOPHIL # BLD AUTO: 0.06 K/UL
EOSINOPHIL NFR BLD AUTO: 1.1 %
ESTIMATED AVERAGE GLUCOSE: 177 MG/DL
GLUCOSE SERPL-MCNC: 351 MG/DL
HBA1C MFR BLD HPLC: 7.8 %
HCT VFR BLD CALC: 49 %
HDLC SERPL-MCNC: 65 MG/DL
HGB BLD-MCNC: 15.8 G/DL
IMM GRANULOCYTES NFR BLD AUTO: 0.2 %
LDLC SERPL CALC-MCNC: 97 MG/DL
LYMPHOCYTES # BLD AUTO: 2.05 K/UL
LYMPHOCYTES NFR BLD AUTO: 38 %
MAN DIFF?: NORMAL
MCHC RBC-ENTMCNC: 29.8 PG
MCHC RBC-ENTMCNC: 32.2 GM/DL
MCV RBC AUTO: 92.5 FL
MONOCYTES # BLD AUTO: 0.49 K/UL
MONOCYTES NFR BLD AUTO: 9.1 %
NEUTROPHILS # BLD AUTO: 2.78 K/UL
NEUTROPHILS NFR BLD AUTO: 51.4 %
NONHDLC SERPL-MCNC: 125 MG/DL
PLATELET # BLD AUTO: 215 K/UL
POTASSIUM SERPL-SCNC: 3.7 MMOL/L
PROT SERPL-MCNC: 7 G/DL
RBC # BLD: 5.3 M/UL
RBC # FLD: 12.4 %
SODIUM SERPL-SCNC: 138 MMOL/L
TRIGL SERPL-MCNC: 139 MG/DL
TSH SERPL-ACNC: 2.27 UIU/ML
WBC # FLD AUTO: 5.4 K/UL

## 2022-01-13 ENCOUNTER — RX RENEWAL (OUTPATIENT)
Age: 77
End: 2022-01-13

## 2022-01-20 ENCOUNTER — NON-APPOINTMENT (OUTPATIENT)
Age: 77
End: 2022-01-20

## 2022-02-17 ENCOUNTER — TRANSCRIPTION ENCOUNTER (OUTPATIENT)
Age: 77
End: 2022-02-17

## 2022-02-18 ENCOUNTER — NON-APPOINTMENT (OUTPATIENT)
Age: 77
End: 2022-02-18

## 2022-02-18 ENCOUNTER — RX RENEWAL (OUTPATIENT)
Age: 77
End: 2022-02-18

## 2022-02-28 ENCOUNTER — APPOINTMENT (OUTPATIENT)
Dept: DERMATOLOGY | Facility: CLINIC | Age: 77
End: 2022-02-28

## 2022-03-21 ENCOUNTER — APPOINTMENT (OUTPATIENT)
Dept: FAMILY MEDICINE | Facility: CLINIC | Age: 77
End: 2022-03-21
Payer: MEDICARE

## 2022-03-21 ENCOUNTER — OUTPATIENT (OUTPATIENT)
Dept: OUTPATIENT SERVICES | Facility: HOSPITAL | Age: 77
LOS: 1 days | End: 2022-03-21
Payer: MEDICARE

## 2022-03-21 ENCOUNTER — RESULT REVIEW (OUTPATIENT)
Age: 77
End: 2022-03-21

## 2022-03-21 ENCOUNTER — APPOINTMENT (OUTPATIENT)
Dept: RADIOLOGY | Facility: CLINIC | Age: 77
End: 2022-03-21
Payer: MEDICARE

## 2022-03-21 VITALS
TEMPERATURE: 97.9 F | HEIGHT: 63 IN | OXYGEN SATURATION: 98 % | SYSTOLIC BLOOD PRESSURE: 120 MMHG | RESPIRATION RATE: 12 BRPM | BODY MASS INDEX: 30.3 KG/M2 | WEIGHT: 171 LBS | DIASTOLIC BLOOD PRESSURE: 78 MMHG | HEART RATE: 87 BPM

## 2022-03-21 DIAGNOSIS — E55.9 VITAMIN D DEFICIENCY, UNSPECIFIED: ICD-10-CM

## 2022-03-21 DIAGNOSIS — Z00.8 ENCOUNTER FOR OTHER GENERAL EXAMINATION: ICD-10-CM

## 2022-03-21 LAB — HBA1C MFR BLD HPLC: 7.7

## 2022-03-21 PROCEDURE — 83036 HEMOGLOBIN GLYCOSYLATED A1C: CPT | Mod: QW

## 2022-03-21 PROCEDURE — 73030 X-RAY EXAM OF SHOULDER: CPT

## 2022-03-21 PROCEDURE — 99214 OFFICE O/P EST MOD 30 MIN: CPT | Mod: 25

## 2022-03-21 PROCEDURE — 73030 X-RAY EXAM OF SHOULDER: CPT | Mod: 26,RT

## 2022-03-21 RX ORDER — KETOCONAZOLE 20 MG/G
2 CREAM TOPICAL
Qty: 60 | Refills: 0 | Status: COMPLETED | COMMUNITY
Start: 2022-02-23

## 2022-03-21 RX ORDER — NAPROXEN 500 MG/1
500 TABLET ORAL
Qty: 20 | Refills: 0 | Status: ACTIVE | COMMUNITY
Start: 2022-03-21 | End: 1900-01-01

## 2022-03-21 NOTE — HISTORY OF PRESENT ILLNESS
[FreeTextEntry1] : Diabetes follow up [de-identified] : This is a 76-year-old female past medical history of hypertension, osteopenia, type 2 diabetes, lumbago, diverticulosis, presenting to the office for Diabetes check, Pt c/o RIGHT shoulder pain s/p fall 4-5 weeks ago in the snow, has taken OTC pain medication with no relief, pain is rated 8/10 when worse, having difficulty raising her arm to brush her hair. Pt states that she broke the fall with her LEFT arm (fallen backwards after slipping on the ice)

## 2022-03-21 NOTE — HEALTH RISK ASSESSMENT
[Former] : Former [No] : In the past 12 months have you used drugs other than those required for medical reasons? No [No falls in past year] : Patient reported no falls in the past year [0] : 2) Feeling down, depressed, or hopeless: Not at all (0) [PHQ-2 Negative - No further assessment needed] : PHQ-2 Negative - No further assessment needed [Reviewed updated] : Reviewed, updated [Aggressive treatment] : aggressive treatment [de-identified] : former smoker x 1 yr 15 yr ago. [Audit-CScore] : 0 [de-identified] : none [de-identified] : salads, less rice and bread [[id number]] : 0 [AdvancecareDate] : 12/21

## 2022-05-20 ENCOUNTER — RX RENEWAL (OUTPATIENT)
Age: 77
End: 2022-05-20

## 2022-05-24 ENCOUNTER — NON-APPOINTMENT (OUTPATIENT)
Age: 77
End: 2022-05-24

## 2022-06-14 RX ORDER — DAPAGLIFLOZIN 10 MG/1
10 TABLET, FILM COATED ORAL
Qty: 90 | Refills: 1 | Status: DISCONTINUED | COMMUNITY
Start: 2021-02-12 | End: 2022-06-14

## 2022-06-15 ENCOUNTER — NON-APPOINTMENT (OUTPATIENT)
Age: 77
End: 2022-06-15

## 2022-06-28 ENCOUNTER — APPOINTMENT (OUTPATIENT)
Dept: FAMILY MEDICINE | Facility: CLINIC | Age: 77
End: 2022-06-28
Payer: MEDICARE

## 2022-06-28 VITALS
DIASTOLIC BLOOD PRESSURE: 76 MMHG | SYSTOLIC BLOOD PRESSURE: 124 MMHG | TEMPERATURE: 97 F | HEART RATE: 79 BPM | OXYGEN SATURATION: 98 % | BODY MASS INDEX: 29.95 KG/M2 | HEIGHT: 63 IN | WEIGHT: 169 LBS | RESPIRATION RATE: 13 BRPM

## 2022-06-28 PROCEDURE — 83036 HEMOGLOBIN GLYCOSYLATED A1C: CPT | Mod: QW

## 2022-06-28 PROCEDURE — 36415 COLL VENOUS BLD VENIPUNCTURE: CPT

## 2022-06-28 PROCEDURE — 99214 OFFICE O/P EST MOD 30 MIN: CPT | Mod: 25

## 2022-06-28 NOTE — COUNSELING
[Fall prevention counseling provided] : Fall prevention counseling provided [Adequate lighting] : Adequate lighting [No throw rugs] : No throw rugs [Use proper foot wear] : Use proper foot wear [Behavioral health counseling provided] : Behavioral health counseling provided [Sleep ___ hours/day] : Sleep [unfilled] hours/day [AUDIT-C Screening administered and reviewed] : AUDIT-C Screening administered and reviewed [Benefits of weight loss discussed] : Benefits of weight loss discussed [Encouraged to increase physical activity] : Encouraged to increase physical activity [____ min/wk Activity] : [unfilled] min/wk activity [None] : None [Good understanding] : Patient has a good understanding of lifestyle changes and steps needed to achieve self management goal

## 2022-06-28 NOTE — HISTORY OF PRESENT ILLNESS
[FreeTextEntry1] : Diabetes follow up [de-identified] : This is a 76-year-old female past medical history of hypertension, osteopenia, type 2 diabetes, lumbago, diverticulosis, presenting to the office for Diabetes check, Pt still c/o RIGHT shoulder pain s/p fall had Shoulder Xray with no acute findings. Pt states that she has not been taking all her diabetes medications due to high deductible that she just met a few days ago, now able to get all of her medications with no high copay.

## 2022-06-28 NOTE — HEALTH RISK ASSESSMENT
[Former] : Former [No] : In the past 12 months have you used drugs other than those required for medical reasons? No [No falls in past year] : Patient reported no falls in the past year [0] : 2) Feeling down, depressed, or hopeless: Not at all (0) [PHQ-2 Negative - No further assessment needed] : PHQ-2 Negative - No further assessment needed [Reviewed updated] : Reviewed, updated [Aggressive treatment] : aggressive treatment [de-identified] : former smoker x 1 yr 15 yr ago. [Audit-CScore] : 0 [de-identified] : none [de-identified] : salads, less rice and bread [[id number]] : 0 [AdvancecareDate] : 12/21

## 2022-06-28 NOTE — ASSESSMENT
[FreeTextEntry1] : This is a 76-year-old female past medical history of hypertension, osteopenia, type 2 diabetes, lumbago, diverticulosis, presenting to the office for diabetes check and c/o RIGHT shoulder pain\par \par MSK/NEURO: RIGHT LE numbness, lumbago, RIGHT shoulder pain s/p fall\par -Naproxen 500 mg bid prn\par -RIGHT shoulder Xray results as above.\par -Renewed Gabapentin 100 mg 2 tabs TID\par \par Endocrine: History of type 2 diabetes.\par -Hemoglobin A1c 7.6-->8.2 -->7.9--> 7.8 --> 7.7 --> 8.0 POCT today\par -Metformin ER 1000 mg once daily, Glipizide ER 5 mg once daily, Pioglitazone 15 mg qd, Jardiance 25 mg once daily.\par -Fasting blood sugars goal to be less than 120.\par -Last diabetic eye exam Sept 2021, no retinopathy.\par \par Cardiovascular: History hypertension.\par -Continue losartan 100 mg, hydrochlorothiazide 25 mg once daily.\par -Continue simvastatin 40 mg at bedtime.\par \par Rheumatology: History of osteopenia, Low vitamin D level\par -Continue calcium and vitamin D supplementation.\par -Last bone density testing done Nov 2021, no changes, Osteopenia.\par \par Neuro: Insomnia\par -Stable on Melatonin 3 mg tab QHS prn\par \par HEME: Elevated Hemoglobin\par -Check CBC\par \par Health care maintenance:\par -Patient had a mammogram done in Nov 2021, BI-RADS 1.\par -GYN Dr Rausch.\par -Colonoscopy screening in June 2018, f/w Diverticulosis..\par -Bone density testing Nov 2021 found with osteopenia.\par -Patient deferred influenza vaccine, Pneumonia vaccine and zoster vaccine.\par -TDaP vaccine given in April 2017.\par -Ophthalmology 03/21, no DR.\par -Declined Flu vaccine\par -Covid vaccination completed 5/14/21, 6/4/21, 12/21/21

## 2022-07-01 LAB
BASOPHILS # BLD AUTO: 0.03 K/UL
BASOPHILS NFR BLD AUTO: 0.4 %
EOSINOPHIL # BLD AUTO: 0.13 K/UL
EOSINOPHIL NFR BLD AUTO: 1.8 %
HCT VFR BLD CALC: 49.7 %
HGB BLD-MCNC: 15.7 G/DL
IMM GRANULOCYTES NFR BLD AUTO: 0.1 %
LYMPHOCYTES # BLD AUTO: 3.07 K/UL
LYMPHOCYTES NFR BLD AUTO: 43.5 %
MAN DIFF?: NORMAL
MCHC RBC-ENTMCNC: 29.3 PG
MCHC RBC-ENTMCNC: 31.6 GM/DL
MCV RBC AUTO: 92.9 FL
MONOCYTES # BLD AUTO: 0.64 K/UL
MONOCYTES NFR BLD AUTO: 9.1 %
NEUTROPHILS # BLD AUTO: 3.17 K/UL
NEUTROPHILS NFR BLD AUTO: 45.1 %
PLATELET # BLD AUTO: 212 K/UL
RBC # BLD: 5.35 M/UL
RBC # FLD: 12.6 %
WBC # FLD AUTO: 7.05 K/UL

## 2022-09-06 ENCOUNTER — APPOINTMENT (OUTPATIENT)
Dept: FAMILY MEDICINE | Facility: CLINIC | Age: 77
End: 2022-09-06

## 2022-09-06 VITALS
HEART RATE: 67 BPM | SYSTOLIC BLOOD PRESSURE: 130 MMHG | DIASTOLIC BLOOD PRESSURE: 74 MMHG | BODY MASS INDEX: 29.59 KG/M2 | HEIGHT: 63 IN | RESPIRATION RATE: 14 BRPM | WEIGHT: 167 LBS | OXYGEN SATURATION: 98 % | TEMPERATURE: 97.31 F

## 2022-09-06 DIAGNOSIS — M25.511 PAIN IN RIGHT SHOULDER: ICD-10-CM

## 2022-09-06 DIAGNOSIS — M79.601 PAIN IN RIGHT ARM: ICD-10-CM

## 2022-09-06 PROCEDURE — 83036 HEMOGLOBIN GLYCOSYLATED A1C: CPT | Mod: QW

## 2022-09-06 PROCEDURE — 99214 OFFICE O/P EST MOD 30 MIN: CPT | Mod: 25

## 2022-09-06 RX ORDER — LIDOCAINE AND PRILOCAINE 25; 25 MG/G; MG/G
2.5-2.5 CREAM TOPICAL
Qty: 60 | Refills: 0 | Status: ACTIVE | COMMUNITY
Start: 2022-06-27

## 2022-09-06 NOTE — PHYSICAL EXAM
[Normal] : no rash [de-identified] : No lower teeth [de-identified] : slight decrease of ROM of RIGHT shoulder

## 2022-09-06 NOTE — REVIEW OF SYSTEMS
[Negative] : Integumentary [FreeTextEntry4] : Lower Gums pain secondary to unfit dentures [FreeTextEntry9] : Shoulder pain

## 2022-09-06 NOTE — HISTORY OF PRESENT ILLNESS
[FreeTextEntry1] : Diabetes follow up [de-identified] : This is a 76-year-old female past medical history of hypertension, osteopenia, type 2 diabetes, lumbago, diverticulosis, presenting to the office for Diabetes check,

## 2022-09-06 NOTE — HEALTH RISK ASSESSMENT
[Former] : Former [No] : In the past 12 months have you used drugs other than those required for medical reasons? No [No falls in past year] : Patient reported no falls in the past year [0] : 2) Feeling down, depressed, or hopeless: Not at all (0) [PHQ-2 Negative - No further assessment needed] : PHQ-2 Negative - No further assessment needed [Reviewed updated] : Reviewed, updated [Aggressive treatment] : aggressive treatment [de-identified] : former smoker x 1 yr 15 yr ago. [Audit-CScore] : 0 [de-identified] : none [de-identified] : salads, less rice and bread [[id number]] : 0 [AdvancecareDate] : 12/21

## 2022-09-06 NOTE — ASSESSMENT
[FreeTextEntry1] : This is a 76-year-old female past medical history of hypertension, osteopenia, type 2 diabetes, lumbago, diverticulosis, presenting to the office for diabetes check and medication  refills\par \par MSK/NEURO: RIGHT LE numbness, lumbago, RIGHT shoulder pain s/p fall\par - Continue Naproxen 500 mg bid prn\par -Continmue Gabapentin 100 mg 2 tabs TID\par \par Endocrine: History of type 2 diabetes.\par -Hemoglobin A1c 7.6-->8.2 -->7.9--> 7.8 --> 7.7 --> 8.0 --> 7.2 POCT today\par -Metformin ER 1000 mg once daily, Glipizide ER 5 mg once daily, Pioglitazone 15 mg qd, Jardiance 25 mg once daily.\par -Fasting blood sugars goal to be less than 120.\par -Last diabetic eye exam Sept 2021, no retinopathy.\par \par Cardiovascular: History hypertension.\par -Blood pressure stable\par -Continue losartan 100 mg, hydrochlorothiazide 25 mg once daily.\par -Continue simvastatin 40 mg at bedtime.\par \par Rheumatology: History of osteopenia, Low vitamin D level\par -Continue calcium and vitamin D supplementation.\par -Last bone density testing done Nov 2021, no changes, Osteopenia.\par \par Neuro: Insomnia\par -Stable on Melatonin 3 mg tab QHS prn\par \par HEME: Elevated Hemoglobin\par -Slightly elevated, stable\par \par Health care maintenance:\par -Patient had a mammogram done in Nov 2021, BI-RADS 1.\par -GYN Dr Rausch.\par -Colonoscopy screening in June 2018, f/w Diverticulosis..\par -Bone density testing Nov 2021 found with osteopenia.\par -Patient deferred influenza vaccine, Pneumonia vaccine and zoster vaccine.\par -TDaP vaccine given in April 2017.\par -Ophthalmology 03/21, no DR.\par -Declined Flu vaccine\par -Covid vaccination completed 5/14/21, 6/4/21, 12/21/21

## 2022-10-10 NOTE — ASSESSMENT
----- Message from Claire Hernandes MD sent at 10/7/2022  6:10 PM CDT -----  Please notify the patient of normal pap smear results.   [FreeTextEntry1] : This is a 74-year-old female past medical history of hypertension, osteopenia, type 2 diabetes, lumbago, diverticulosis, presenting to the office for diabetes check and c/o RIGHT shoulder pain\par \par MSK/NEURO: RIGHT LE numbness, lumbago, RIGHT shoulder pain s/p fall\par -Naproxen 500 mg bid prn\par -RIGHT shoulder Xray ordered.\par -Renewed Gabapentin 100 mg 2 tabs TID\par \par Endocrine: History of type 2 diabetes.\par -Hemoglobin A1c 7.6-->8.2 -->7.9--> 7.8 --> 7.7 today\par -Metformin ER 1000 mg once daily, Glipizide ER 5 mg once daily, Pioglitazone 15 mg qd, Farxiga 10 mg daily, renewals all e-prescribed.\par -Fasting blood sugars goal to be less than 120.\par -Last diabetic eye exam Sept 2021, no retinopathy.\par \par Cardiovascular: History hypertension.\par -Continue losartan 100 mg, hydrochlorothiazide 25 mg once daily.\par -Continue simvastatin 40 mg at bedtime.\par \par Rheumatology: History of osteopenia, Low vitamin D level\par -Continue calcium and vitamin D supplementation.\par -Last bone density testing done Nov 2021, no changes, Osteopenia.\par \par Neuro: Insomnia\par -Stable on Melatonin 3 mg tab QHS prn\par \par Health care maintenance:\par -Patient had a mammogram done in Nov 2021, BI-RADS 1.\par -GYN Dr Rausch.\par -Colonoscopy screening in June 2018, f/w Diverticulosis..\par -Bone density testing Nov 2021 found with osteopenia.\par -Patient deferred influenza vaccine, Pneumonia vaccine and zoster vaccine.\par -TDaP vaccine given in April 2017.\par -Ophthalmology 03/21, no DR.\par -Declined Flu vaccine\par -Covid vaccination completed 5/14/21, 6/4/21, 12/21/21

## 2022-12-20 ENCOUNTER — APPOINTMENT (OUTPATIENT)
Dept: FAMILY MEDICINE | Facility: CLINIC | Age: 77
End: 2022-12-20

## 2022-12-20 VITALS
HEIGHT: 63 IN | OXYGEN SATURATION: 98 % | DIASTOLIC BLOOD PRESSURE: 70 MMHG | WEIGHT: 167 LBS | TEMPERATURE: 97.8 F | SYSTOLIC BLOOD PRESSURE: 122 MMHG | BODY MASS INDEX: 29.59 KG/M2 | RESPIRATION RATE: 16 BRPM | HEART RATE: 63 BPM

## 2022-12-20 DIAGNOSIS — Z00.00 ENCOUNTER FOR GENERAL ADULT MEDICAL EXAMINATION W/OUT ABNORMAL FINDINGS: ICD-10-CM

## 2022-12-20 PROCEDURE — G0439: CPT

## 2022-12-20 PROCEDURE — 83036 HEMOGLOBIN GLYCOSYLATED A1C: CPT | Mod: QW

## 2022-12-20 NOTE — HISTORY OF PRESENT ILLNESS
[FreeTextEntry1] : Physical exam. [de-identified] : This is a 77-year-old female past medical history of hypertension, osteopenia, type 2 diabetes, lumbago, diverticulosis, presenting to the office for wellness exam and diabetes check,

## 2022-12-20 NOTE — ASSESSMENT
[FreeTextEntry1] : This is a 77-year-old female past medical history of hypertension, osteopenia, type 2 diabetes, lumbago, diverticulosis, presenting to the office for diabetes check and medication  refills\par \par MSK/NEURO: RIGHT LE numbness, lumbago, RIGHT shoulder pain s/p fall\par -Continue Naproxen 500 mg bid prn\par -Continmue Gabapentin 100 mg 2 tabs TID\par \par Endocrine: History of type 2 diabetes.\par -Hemoglobin A1c 7.6-->8.2 -->7.9--> 7.8 --> 7.8 --> 8.0 --> 7.2 --> 7.5 POCT today\par -Metformin ER 1000 mg once daily, Glipizide ER 5 mg once daily, Pioglitazone 15 mg qd, Jardiance 25 mg once daily.\par -Fasting blood sugars goal to be less than 120.\par -Last diabetic eye exam Sept 2022, no retinopathy.\par \par Cardiovascular: History hypertension.\par -Blood pressure stable\par -Continue losartan 100 mg, hydrochlorothiazide 25 mg once daily.\par -Continue simvastatin 40 mg at bedtime.\par \par Rheumatology: History of osteopenia, Low vitamin D level\par -Continue calcium and vitamin D supplementation, e-refilled.\par -Last bone density testing done Nov 2021, no changes, Osteopenia.\par \par Neuro: Insomnia\par -Stable on Melatonin 3 mg tab QHS prn\par \par HEME: Elevated Hemoglobin\par -Slightly elevated, stable\par \par Health care maintenance:\par -Patient had a mammogram done in Nov 2021, BI-RADS 1, referral provided.\par -GYN Dr Rausch.\par -Colonoscopy screening in June 2018, f/w Diverticulosis..\par -Bone density testing Nov 2021 found with osteopenia.\par -Patient deferred influenza vaccine, Pneumonia vaccine and zoster vaccine.\par -TDaP vaccine given in April 2017.\par -Ophthalmology 03/21, no DR.\par -Declined Flu vaccine\par -Covid vaccination completed 5/14/21, 6/4/21, 12/21/21

## 2022-12-20 NOTE — HEALTH RISK ASSESSMENT
[Good] : ~his/her~  mood as  good [Former] : Former [No] : In the past 12 months have you used drugs other than those required for medical reasons? No [No falls in past year] : Patient reported no falls in the past year [0] : 2) Feeling down, depressed, or hopeless: Not at all (0) [PHQ-2 Negative - No further assessment needed] : PHQ-2 Negative - No further assessment needed [No Retinopathy] : No retinopathy [Patient reported PAP Smear was normal] : Patient reported PAP Smear was normal [HIV test declined] : HIV test declined [Hepatitis C test declined] : Hepatitis C test declined [None] : None [Alone] : lives alone [Employed] : employed [High School] : high school [] :  [# Of Children ___] : has [unfilled] children [Feels Safe at Home] : Feels safe at home [Fully functional (bathing, dressing, toileting, transferring, walking, feeding)] : Fully functional (bathing, dressing, toileting, transferring, walking, feeding) [Fully functional (using the telephone, shopping, preparing meals, housekeeping, doing laundry, using] : Fully functional and needs no help or supervision to perform IADLs (using the telephone, shopping, preparing meals, housekeeping, doing laundry, using transportation, managing medications and managing finances) [Smoke Detector] : smoke detector [Carbon Monoxide Detector] : carbon monoxide detector [Seat Belt] :  uses seat belt [Sunscreen] : uses sunscreen [With Patient/Caregiver] : , with patient/caregiver [Reviewed updated] : Reviewed, updated [Aggressive treatment] : aggressive treatment [de-identified] : former smoker x 1 yr 15 yr ago. [Audit-CScore] : 0 [de-identified] : none [de-identified] : salads, less rice and bread [[id number]] : 0 [EyeExamDate] : 01/18 [Change in mental status noted] : No change in mental status noted [Language] : denies difficulty with language [Behavior] : denies difficulty with behavior [Learning/Retaining New Information] : denies difficulty learning/retaining new information [Handling Complex Tasks] : denies difficulty handling complex tasks [Reasoning] : denies difficulty with reasoning [Spatial Ability and Orientation] : denies difficulty with spatial ability and orientation [Sexually Active] : not sexually active [High Risk Behavior] : no high risk behavior [Reports changes in hearing] : Reports no changes in hearing [Reports changes in vision] : Reports no changes in vision [Reports changes in dental health] : Reports no changes in dental health [Guns at Home] : no guns at home [TB Exposure] : is not being exposed to tuberculosis [MammogramDate] : 11/21 [MammogramComments] : BI-RADS 1  [PapSmearDate] : 02/19 [BoneDensityDate] : 11/21 [BoneDensityComments] : Osteopenia [ColonoscopyDate] : 06/2018 [ColonoscopyComments] : Normal, no need for further colonoscopies. 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

## 2022-12-21 ENCOUNTER — RESULT CHARGE (OUTPATIENT)
Age: 77
End: 2022-12-21

## 2023-01-05 ENCOUNTER — RX RENEWAL (OUTPATIENT)
Age: 78
End: 2023-01-05

## 2023-01-11 ENCOUNTER — RX ONLY (RX ONLY)
Age: 78
End: 2023-01-11

## 2023-01-11 ENCOUNTER — OFFICE (OUTPATIENT)
Dept: URBAN - METROPOLITAN AREA CLINIC 12 | Facility: CLINIC | Age: 78
Setting detail: OPHTHALMOLOGY
End: 2023-01-11
Payer: COMMERCIAL

## 2023-01-11 DIAGNOSIS — H40.013: ICD-10-CM

## 2023-01-11 DIAGNOSIS — H18.513: ICD-10-CM

## 2023-01-11 DIAGNOSIS — H35.3131: ICD-10-CM

## 2023-01-11 DIAGNOSIS — H43.393: ICD-10-CM

## 2023-01-11 DIAGNOSIS — H02.403: ICD-10-CM

## 2023-01-11 DIAGNOSIS — H43.813: ICD-10-CM

## 2023-01-11 DIAGNOSIS — Z96.1: ICD-10-CM

## 2023-01-11 PROCEDURE — 92014 COMPRE OPH EXAM EST PT 1/>: CPT | Performed by: OPHTHALMOLOGY

## 2023-01-11 PROCEDURE — 92250 FUNDUS PHOTOGRAPHY W/I&R: CPT | Performed by: OPHTHALMOLOGY

## 2023-01-11 PROCEDURE — 92083 EXTENDED VISUAL FIELD XM: CPT | Performed by: OPHTHALMOLOGY

## 2023-01-11 ASSESSMENT — CONFRONTATIONAL VISUAL FIELD TEST (CVF)
OS_FINDINGS: FULL
OD_FINDINGS: FULL

## 2023-01-11 ASSESSMENT — REFRACTION_CURRENTRX
OD_CYLINDER: -0.50
OS_SPHERE: -0.25
OD_ADD: +2.50
OD_SPHERE: -1.75
OD_OVR_VA: 20/
OS_ADD: +2.50
OS_OVR_VA: 20/
OD_VPRISM_DIRECTION: PROGS
OS_VPRISM_DIRECTION: PROGS
OS_CYLINDER: -1.25
OS_AXIS: 080
OD_AXIS: 128

## 2023-01-11 ASSESSMENT — REFRACTION_MANIFEST
OS_AXIS: 072
OD_CYLINDER: -0.25
OS_SPHERE: PLANO
OD_AXIS: 126
OS_VA1: 20/40-2
OS_CYLINDER: -1.50
OS_ADD: +3.00
OD_ADD: +3.00
OD_VA1: 20/40-1
OD_SPHERE: +1.75

## 2023-01-11 ASSESSMENT — KERATOMETRY
OS_K1POWER_DIOPTERS: 42.25
OS_K2POWER_DIOPTERS: 42.50
OD_AXISANGLE_DEGREES: 075
METHOD_AUTO_MANUAL: AUTO
OS_AXISANGLE_DEGREES: 122
OD_K2POWER_DIOPTERS: 43.00
OD_K1POWER_DIOPTERS: 42.50

## 2023-01-11 ASSESSMENT — CORNEAL DYSTROPHY - POSTERIOR
OD_POSTERIORDYSTROPHY: SEVERE GUTTATA
OS_POSTERIORDYSTROPHY: SEVERE GUTTATA

## 2023-01-11 ASSESSMENT — REFRACTION_AUTOREFRACTION
OD_SPHERE: -1.50
OD_AXIS: 109
OS_SPHERE: PLANO
OS_AXIS: 069
OD_CYLINDER: -0.50
OS_CYLINDER: -1.00

## 2023-01-11 ASSESSMENT — SPHEQUIV_DERIVED
OD_SPHEQUIV: -1.75
OD_SPHEQUIV: 1.625

## 2023-01-11 ASSESSMENT — AXIALLENGTH_DERIVED
OD_AL: 23.2396
OD_AL: 24.5897

## 2023-01-11 ASSESSMENT — VISUAL ACUITY
OD_BCVA: 20/50+1
OS_BCVA: 20/40-1

## 2023-01-11 ASSESSMENT — LID POSITION - PTOSIS
OD_PTOSIS: RUL
OS_PTOSIS: LUL

## 2023-01-11 ASSESSMENT — TONOMETRY
OD_IOP_MMHG: 20
OS_IOP_MMHG: 20

## 2023-01-17 ENCOUNTER — APPOINTMENT (OUTPATIENT)
Dept: MAMMOGRAPHY | Facility: CLINIC | Age: 78
End: 2023-01-17
Payer: MEDICARE

## 2023-01-17 ENCOUNTER — RESULT REVIEW (OUTPATIENT)
Age: 78
End: 2023-01-17

## 2023-01-17 ENCOUNTER — OUTPATIENT (OUTPATIENT)
Dept: OUTPATIENT SERVICES | Facility: HOSPITAL | Age: 78
LOS: 1 days | End: 2023-01-17
Payer: MEDICARE

## 2023-01-17 DIAGNOSIS — Z12.31 ENCOUNTER FOR SCREENING MAMMOGRAM FOR MALIGNANT NEOPLASM OF BREAST: ICD-10-CM

## 2023-01-17 PROCEDURE — 77067 SCR MAMMO BI INCL CAD: CPT

## 2023-01-17 PROCEDURE — 77067 SCR MAMMO BI INCL CAD: CPT | Mod: 26

## 2023-01-17 PROCEDURE — 77063 BREAST TOMOSYNTHESIS BI: CPT | Mod: 26

## 2023-01-17 PROCEDURE — 77063 BREAST TOMOSYNTHESIS BI: CPT

## 2023-01-19 LAB
25(OH)D3 SERPL-MCNC: 27.1 NG/ML
ALBUMIN SERPL ELPH-MCNC: 4.5 G/DL
ALP BLD-CCNC: 60 U/L
ALT SERPL-CCNC: 17 U/L
ANION GAP SERPL CALC-SCNC: 13 MMOL/L
APPEARANCE: CLEAR
AST SERPL-CCNC: 14 U/L
BACTERIA: NEGATIVE
BASOPHILS # BLD AUTO: 0.02 K/UL
BASOPHILS NFR BLD AUTO: 0.5 %
BILIRUB SERPL-MCNC: 0.5 MG/DL
BILIRUBIN URINE: NEGATIVE
BLOOD URINE: NEGATIVE
BUN SERPL-MCNC: 19 MG/DL
CALCIUM SERPL-MCNC: 10.3 MG/DL
CHLORIDE SERPL-SCNC: 102 MMOL/L
CHOLEST SERPL-MCNC: 182 MG/DL
CO2 SERPL-SCNC: 28 MMOL/L
COLOR: NORMAL
CREAT SERPL-MCNC: 0.62 MG/DL
EGFR: 93 ML/MIN/1.73M2
EOSINOPHIL # BLD AUTO: 0.07 K/UL
EOSINOPHIL NFR BLD AUTO: 1.7 %
ESTIMATED AVERAGE GLUCOSE: 177 MG/DL
GLUCOSE QUALITATIVE U: ABNORMAL
GLUCOSE SERPL-MCNC: 183 MG/DL
HBA1C MFR BLD HPLC: 7.8 %
HCT VFR BLD CALC: 47.8 %
HDLC SERPL-MCNC: 68 MG/DL
HGB BLD-MCNC: 15.4 G/DL
HYALINE CASTS: 0 /LPF
IMM GRANULOCYTES NFR BLD AUTO: 0.2 %
KETONES URINE: NEGATIVE
LDLC SERPL CALC-MCNC: 84 MG/DL
LEUKOCYTE ESTERASE URINE: NEGATIVE
LYMPHOCYTES # BLD AUTO: 2.12 K/UL
LYMPHOCYTES NFR BLD AUTO: 50.2 %
MAN DIFF?: NORMAL
MCHC RBC-ENTMCNC: 29.7 PG
MCHC RBC-ENTMCNC: 32.2 GM/DL
MCV RBC AUTO: 92.1 FL
MICROSCOPIC-UA: NORMAL
MONOCYTES # BLD AUTO: 0.35 K/UL
MONOCYTES NFR BLD AUTO: 8.3 %
NEUTROPHILS # BLD AUTO: 1.65 K/UL
NEUTROPHILS NFR BLD AUTO: 39.1 %
NITRITE URINE: NEGATIVE
NONHDLC SERPL-MCNC: 114 MG/DL
PH URINE: 6
PLATELET # BLD AUTO: 208 K/UL
POTASSIUM SERPL-SCNC: 4.5 MMOL/L
PROT SERPL-MCNC: 6.9 G/DL
PROTEIN URINE: NEGATIVE
RBC # BLD: 5.19 M/UL
RBC # FLD: 12.3 %
RED BLOOD CELLS URINE: 1 /HPF
SODIUM SERPL-SCNC: 142 MMOL/L
SPECIFIC GRAVITY URINE: 1.04
SQUAMOUS EPITHELIAL CELLS: 8 /HPF
TRIGL SERPL-MCNC: 151 MG/DL
TSH SERPL-ACNC: 1.25 UIU/ML
UROBILINOGEN URINE: NORMAL
WBC # FLD AUTO: 4.22 K/UL
WHITE BLOOD CELLS URINE: 0 /HPF

## 2023-01-24 ENCOUNTER — NON-APPOINTMENT (OUTPATIENT)
Age: 78
End: 2023-01-24

## 2023-01-24 RX ORDER — METFORMIN HYDROCHLORIDE 1000 MG/1
1000 TABLET, FILM COATED, EXTENDED RELEASE ORAL
Qty: 180 | Refills: 1 | Status: DISCONTINUED | COMMUNITY
Start: 2020-05-28 | End: 2023-01-24

## 2023-02-03 ENCOUNTER — OFFICE (OUTPATIENT)
Dept: URBAN - METROPOLITAN AREA CLINIC 12 | Facility: CLINIC | Age: 78
Setting detail: OPHTHALMOLOGY
End: 2023-02-03
Payer: COMMERCIAL

## 2023-02-03 DIAGNOSIS — H00.11: ICD-10-CM

## 2023-02-03 DIAGNOSIS — L03.213: ICD-10-CM

## 2023-02-03 PROCEDURE — 92012 INTRM OPH EXAM EST PATIENT: CPT | Performed by: OPHTHALMOLOGY

## 2023-02-03 ASSESSMENT — REFRACTION_CURRENTRX
OD_ADD: +2.50
OS_AXIS: 080
OD_SPHERE: -1.75
OS_CYLINDER: -1.25
OS_SPHERE: -0.25
OD_VPRISM_DIRECTION: PROGS
OS_VPRISM_DIRECTION: PROGS
OS_OVR_VA: 20/
OS_ADD: +2.50
OD_AXIS: 128
OD_CYLINDER: -0.50
OD_OVR_VA: 20/

## 2023-02-03 ASSESSMENT — KERATOMETRY
OS_K2POWER_DIOPTERS: 42.50
OD_K2POWER_DIOPTERS: 43.00
OD_K1POWER_DIOPTERS: 42.50
OD_AXISANGLE_DEGREES: 075
METHOD_AUTO_MANUAL: AUTO
OS_AXISANGLE_DEGREES: 122
OS_K1POWER_DIOPTERS: 42.25

## 2023-02-03 ASSESSMENT — REFRACTION_MANIFEST
OS_CYLINDER: -1.50
OS_VA1: 20/40-2
OS_ADD: +3.00
OD_ADD: +3.00
OS_AXIS: 072
OD_SPHERE: +1.75
OD_CYLINDER: -0.25
OS_SPHERE: PLANO
OD_AXIS: 126
OD_VA1: 20/40-1

## 2023-02-03 ASSESSMENT — REFRACTION_AUTOREFRACTION
OD_AXIS: 109
OS_SPHERE: PLANO
OS_AXIS: 069
OD_SPHERE: -1.50
OD_CYLINDER: -0.50
OS_CYLINDER: -1.00

## 2023-02-03 ASSESSMENT — CORNEAL DYSTROPHY - POSTERIOR
OD_POSTERIORDYSTROPHY: SEVERE GUTTATA
OS_POSTERIORDYSTROPHY: SEVERE GUTTATA

## 2023-02-03 ASSESSMENT — CONFRONTATIONAL VISUAL FIELD TEST (CVF)
OD_FINDINGS: FULL
OS_FINDINGS: FULL

## 2023-02-03 ASSESSMENT — AXIALLENGTH_DERIVED
OD_AL: 24.5897
OD_AL: 23.2396

## 2023-02-03 ASSESSMENT — LID POSITION - PTOSIS
OD_PTOSIS: RUL
OS_PTOSIS: LUL

## 2023-02-03 ASSESSMENT — VISUAL ACUITY
OS_BCVA: 20/40-1
OD_BCVA: 20/50+1

## 2023-02-03 ASSESSMENT — SPHEQUIV_DERIVED
OD_SPHEQUIV: -1.75
OD_SPHEQUIV: 1.625

## 2023-02-04 ENCOUNTER — OFFICE (OUTPATIENT)
Dept: URBAN - METROPOLITAN AREA CLINIC 100 | Facility: CLINIC | Age: 78
Setting detail: OPHTHALMOLOGY
End: 2023-02-04
Payer: COMMERCIAL

## 2023-02-04 DIAGNOSIS — L03.213: ICD-10-CM

## 2023-02-04 PROCEDURE — 99213 OFFICE O/P EST LOW 20 MIN: CPT | Performed by: STUDENT IN AN ORGANIZED HEALTH CARE EDUCATION/TRAINING PROGRAM

## 2023-02-04 ASSESSMENT — AXIALLENGTH_DERIVED
OD_AL: 23.2396
OD_AL: 24.5897

## 2023-02-04 ASSESSMENT — REFRACTION_AUTOREFRACTION
OS_CYLINDER: -1.00
OD_AXIS: 109
OS_AXIS: 069
OD_SPHERE: -1.50
OS_SPHERE: PLANO
OD_CYLINDER: -0.50

## 2023-02-04 ASSESSMENT — REFRACTION_CURRENTRX
OS_VPRISM_DIRECTION: PROGS
OD_ADD: +2.50
OD_OVR_VA: 20/
OD_VPRISM_DIRECTION: PROGS
OS_SPHERE: -0.25
OS_AXIS: 080
OS_OVR_VA: 20/
OD_CYLINDER: -0.50
OD_SPHERE: -1.75
OD_AXIS: 128
OS_CYLINDER: -1.25
OS_ADD: +2.50

## 2023-02-04 ASSESSMENT — KERATOMETRY
OD_K2POWER_DIOPTERS: 43.00
OS_K2POWER_DIOPTERS: 42.50
OD_AXISANGLE_DEGREES: 075
OD_K1POWER_DIOPTERS: 42.50
OS_K1POWER_DIOPTERS: 42.25
OS_AXISANGLE_DEGREES: 122
METHOD_AUTO_MANUAL: AUTO

## 2023-02-04 ASSESSMENT — SPHEQUIV_DERIVED
OD_SPHEQUIV: 1.625
OD_SPHEQUIV: -1.75

## 2023-02-04 ASSESSMENT — REFRACTION_MANIFEST
OD_AXIS: 126
OD_SPHERE: +1.75
OS_AXIS: 072
OD_ADD: +3.00
OS_SPHERE: PLANO
OD_CYLINDER: -0.25
OS_ADD: +3.00
OS_CYLINDER: -1.50
OD_VA1: 20/40-1
OS_VA1: 20/40-2

## 2023-02-04 ASSESSMENT — LID POSITION - PTOSIS
OS_PTOSIS: LUL
OD_PTOSIS: RUL

## 2023-02-04 ASSESSMENT — VISUAL ACUITY
OS_BCVA: 20/300
OD_BCVA: 20/50+1

## 2023-02-04 ASSESSMENT — CORNEAL DYSTROPHY - POSTERIOR
OS_POSTERIORDYSTROPHY: SEVERE GUTTATA
OD_POSTERIORDYSTROPHY: SEVERE GUTTATA

## 2023-02-04 ASSESSMENT — CONFRONTATIONAL VISUAL FIELD TEST (CVF)
OS_FINDINGS: FULL
OD_FINDINGS: FULL

## 2023-02-07 ENCOUNTER — OFFICE (OUTPATIENT)
Dept: URBAN - METROPOLITAN AREA CLINIC 12 | Facility: CLINIC | Age: 78
Setting detail: OPHTHALMOLOGY
End: 2023-02-07
Payer: COMMERCIAL

## 2023-02-07 DIAGNOSIS — L03.213: ICD-10-CM

## 2023-02-07 PROBLEM — H02.403 PTOSIS OF EYELID, UNSPECIFIED: Status: ACTIVE | Noted: 2023-01-11

## 2023-02-07 PROCEDURE — 92012 INTRM OPH EXAM EST PATIENT: CPT | Performed by: OPHTHALMOLOGY

## 2023-02-07 ASSESSMENT — SPHEQUIV_DERIVED
OS_SPHEQUIV: -0.75
OD_SPHEQUIV: 1.625
OD_SPHEQUIV: -1.75

## 2023-02-07 ASSESSMENT — KERATOMETRY
OS_K1POWER_DIOPTERS: 42.25
OD_K1POWER_DIOPTERS: 42.75
METHOD_AUTO_MANUAL: AUTO
OD_AXISANGLE_DEGREES: 075
OD_K2POWER_DIOPTERS: 43.25
OS_K2POWER_DIOPTERS: 42.75
OS_AXISANGLE_DEGREES: 133

## 2023-02-07 ASSESSMENT — REFRACTION_MANIFEST
OS_SPHERE: PLANO
OS_ADD: +3.00
OD_VA1: 20/40-1
OD_AXIS: 126
OS_AXIS: 072
OS_CYLINDER: -1.50
OD_ADD: +3.00
OD_CYLINDER: -0.25
OS_VA1: 20/40-2
OD_SPHERE: +1.75

## 2023-02-07 ASSESSMENT — VISUAL ACUITY
OD_BCVA: 20/50-2
OS_BCVA: 20/60

## 2023-02-07 ASSESSMENT — REFRACTION_CURRENTRX
OD_VPRISM_DIRECTION: PROGS
OS_AXIS: 080
OS_ADD: +2.50
OS_VPRISM_DIRECTION: PROGS
OD_CYLINDER: -0.50
OD_SPHERE: -1.75
OS_OVR_VA: 20/
OD_AXIS: 128
OS_SPHERE: -0.25
OD_ADD: +2.50
OD_OVR_VA: 20/
OS_CYLINDER: -1.25

## 2023-02-07 ASSESSMENT — REFRACTION_AUTOREFRACTION
OS_CYLINDER: -1.50
OD_AXIS: 102
OS_SPHERE: 0.00
OS_AXIS: 076
OD_SPHERE: -1.50
OD_CYLINDER: -0.50

## 2023-02-07 ASSESSMENT — CORNEAL DYSTROPHY - POSTERIOR
OS_POSTERIORDYSTROPHY: SEVERE GUTTATA
OD_POSTERIORDYSTROPHY: SEVERE GUTTATA

## 2023-02-07 ASSESSMENT — TONOMETRY
OS_IOP_MMHG: 19
OD_IOP_MMHG: 16

## 2023-02-07 ASSESSMENT — LID POSITION - PTOSIS
OD_PTOSIS: RUL
OS_PTOSIS: LUL

## 2023-02-07 ASSESSMENT — CONFRONTATIONAL VISUAL FIELD TEST (CVF)
OS_FINDINGS: FULL
OD_FINDINGS: FULL

## 2023-02-07 ASSESSMENT — AXIALLENGTH_DERIVED
OD_AL: 23.1509
OD_AL: 24.4903
OS_AL: 24.2704

## 2023-02-09 ENCOUNTER — APPOINTMENT (OUTPATIENT)
Dept: FAMILY MEDICINE | Facility: CLINIC | Age: 78
End: 2023-02-09
Payer: MEDICARE

## 2023-02-09 VITALS
WEIGHT: 168 LBS | SYSTOLIC BLOOD PRESSURE: 118 MMHG | TEMPERATURE: 98 F | DIASTOLIC BLOOD PRESSURE: 74 MMHG | BODY MASS INDEX: 29.77 KG/M2 | RESPIRATION RATE: 16 BRPM | HEART RATE: 70 BPM | HEIGHT: 63 IN | OXYGEN SATURATION: 98 %

## 2023-02-09 DIAGNOSIS — M54.50 LOW BACK PAIN, UNSPECIFIED: ICD-10-CM

## 2023-02-09 DIAGNOSIS — R05.3 CHRONIC COUGH: ICD-10-CM

## 2023-02-09 PROCEDURE — 99214 OFFICE O/P EST MOD 30 MIN: CPT

## 2023-02-14 NOTE — HISTORY OF PRESENT ILLNESS
[de-identified] : This is a 77-year-old female past medical history of hypertension, osteopenia, type 2 diabetes, lumbago, diverticulosis, presenting to the office for wellness exam and diabetes check, [FreeTextEntry8] : This is a 77-year-old female past medical history of hypertension, osteopenia, type 2 diabetes, lumbago, diverticulosis, presenting to the office complaining of continued cough since last visit, mostly dry cough, denies fever, no sick contacts at home.

## 2023-02-14 NOTE — ASSESSMENT
[FreeTextEntry1] : This is a 77-year-old female past medical history of hypertension, osteopenia, type 2 diabetes, lumbago, diverticulosis, presenting to the office for complains of cough.\par \par PULM: Persistent Dry cough\par -Start Benzonatate\par -?GERD related, if no improvement will try Famotidine\par \par MSK/NEURO: RIGHT LE numbness, lumbago, RIGHT shoulder pain s/p fall\par -Continue Naproxen 500 mg bid prn\par -Continue Gabapentin 100 mg 2 tabs TID\par \par Endocrine: History of type 2 diabetes.\par -Hemoglobin A1c 7.6-->8.2 -->7.9--> 7.8 --> 7.8 --> 8.0 --> 7.2 --> 7.5 \par -Metformin ER 1000 mg once daily, Glipizide ER 5 mg once daily, Pioglitazone 15 mg qd, Jardiance 25 mg once daily.\par -Fasting blood sugars goal to be less than 120.\par -Last diabetic eye exam Sept 2022, no retinopathy.\par \par Cardiovascular: History hypertension.\par -Blood pressure stable\par -Continue losartan 100 mg, hydrochlorothiazide 25 mg once daily.\par -Continue simvastatin 40 mg at bedtime.\par \par Rheumatology: History of osteopenia, Low vitamin D level\par -Continue calcium and vitamin D supplementation, e-refilled.\par -Last bone density testing done Nov 2021, no changes, Osteopenia.\par \par Neuro: Insomnia\par -Stable on Melatonin 3 mg tab QHS prn\par \par HEME: Elevated Hemoglobin\par -Slightly elevated, stable\par \par Health care maintenance:\par -Patient had a mammogram done in Nov 2021, BI-RADS 1, referral provided.\par -GYN Dr Rausch.\par -Colonoscopy screening in June 2018, f/w Diverticulosis, no longer need to get Colonoscopy screening.\par -Bone density testing Nov 2021 found with osteopenia.\par -Patient deferred influenza vaccine, Pneumonia vaccine and zoster vaccine.\par -TDaP vaccine given in April 2017.\par -Ophthalmology 03/21, no DR.\par -Covid vaccination completed 5/14/21, 6/4/21, 12/21/21

## 2023-02-14 NOTE — HEALTH RISK ASSESSMENT
[No] : In the past 12 months have you used drugs other than those required for medical reasons? No [No falls in past year] : Patient reported no falls in the past year [0] : 2) Feeling down, depressed, or hopeless: Not at all (0) [PHQ-2 Negative - No further assessment needed] : PHQ-2 Negative - No further assessment needed [With Patient/Caregiver] : , with patient/caregiver [Reviewed updated] : Reviewed, updated [Aggressive treatment] : aggressive treatment [Former] : Former [0-4] : 0-4 [Audit-CScore] : 0 [de-identified] : none [de-identified] : salads, less rice and bread [[id number]] : 0 [de-identified] : 2 [AdvancecareDate] : 02/23

## 2023-02-17 ENCOUNTER — RX ONLY (RX ONLY)
Age: 78
End: 2023-02-17

## 2023-02-17 ENCOUNTER — OFFICE (OUTPATIENT)
Dept: URBAN - METROPOLITAN AREA CLINIC 12 | Facility: CLINIC | Age: 78
Setting detail: OPHTHALMOLOGY
End: 2023-02-17
Payer: COMMERCIAL

## 2023-02-17 DIAGNOSIS — L03.213: ICD-10-CM

## 2023-02-17 DIAGNOSIS — H00.11: ICD-10-CM

## 2023-02-17 PROCEDURE — 92012 INTRM OPH EXAM EST PATIENT: CPT | Performed by: OPHTHALMOLOGY

## 2023-02-17 ASSESSMENT — REFRACTION_CURRENTRX
OD_AXIS: 124
OS_VPRISM_DIRECTION: PROGS
OS_OVR_VA: 20/
OD_SPHERE: -1.75
OS_ADD: +3.00
OD_CYLINDER: -0.50
OS_CYLINDER: -1.25
OS_AXIS: 078
OD_ADD: +3.00
OS_SPHERE: -0.25
OD_VPRISM_DIRECTION: PROGS
OD_OVR_VA: 20/

## 2023-02-17 ASSESSMENT — VISUAL ACUITY
OS_BCVA: 20/60
OD_BCVA: 20/50-2

## 2023-02-17 ASSESSMENT — REFRACTION_MANIFEST
OD_SPHERE: +1.75
OS_VA1: 20/40-2
OD_ADD: +3.00
OS_CYLINDER: -1.50
OD_VA1: 20/40-1
OD_AXIS: 126
OS_ADD: +3.00
OD_CYLINDER: -0.25
OS_SPHERE: PLANO
OS_AXIS: 072

## 2023-02-17 ASSESSMENT — CORNEAL DYSTROPHY - POSTERIOR
OD_POSTERIORDYSTROPHY: SEVERE GUTTATA
OS_POSTERIORDYSTROPHY: SEVERE GUTTATA

## 2023-02-17 ASSESSMENT — KERATOMETRY
OS_K2POWER_DIOPTERS: 42.75
OD_K1POWER_DIOPTERS: 43.00
OS_K1POWER_DIOPTERS: 41.75
OS_AXISANGLE_DEGREES: 163
OD_AXISANGLE_DEGREES: 082
METHOD_AUTO_MANUAL: AUTO
OD_K2POWER_DIOPTERS: 44.00

## 2023-02-17 ASSESSMENT — AXIALLENGTH_DERIVED
OD_AL: 22.9753
OD_AL: 24.4499

## 2023-02-17 ASSESSMENT — SPHEQUIV_DERIVED
OD_SPHEQUIV: -2.125
OD_SPHEQUIV: 1.625

## 2023-02-17 ASSESSMENT — LID POSITION - PTOSIS
OS_PTOSIS: LUL
OD_PTOSIS: RUL

## 2023-02-17 ASSESSMENT — TONOMETRY
OD_IOP_MMHG: 21
OS_IOP_MMHG: 16

## 2023-02-17 ASSESSMENT — CONFRONTATIONAL VISUAL FIELD TEST (CVF)
OD_FINDINGS: FULL
OS_FINDINGS: FULL

## 2023-02-17 ASSESSMENT — REFRACTION_AUTOREFRACTION
OS_CYLINDER: -1.25
OS_AXIS: 071
OS_SPHERE: PLANO
OD_CYLINDER: -0.75
OD_AXIS: 105
OD_SPHERE: -1.75

## 2023-02-28 RX ORDER — BLOOD-GLUCOSE METER
EACH MISCELLANEOUS
Qty: 1 | Refills: 0 | Status: DISCONTINUED | COMMUNITY
Start: 2020-01-21 | End: 2023-02-28

## 2023-02-28 RX ORDER — BLOOD-GLUCOSE METER
W/DEVICE EACH MISCELLANEOUS
Qty: 1 | Refills: 0 | Status: DISCONTINUED | COMMUNITY
Start: 2022-01-14 | End: 2023-02-28

## 2023-02-28 RX ORDER — BLOOD SUGAR DIAGNOSTIC
STRIP MISCELLANEOUS TWICE DAILY
Qty: 200 | Refills: 0 | Status: DISCONTINUED | COMMUNITY
Start: 2020-01-21 | End: 2023-02-28

## 2023-02-28 RX ORDER — LANCETS
EACH MISCELLANEOUS
Qty: 200 | Refills: 0 | Status: DISCONTINUED | COMMUNITY
Start: 2020-01-21 | End: 2023-02-28

## 2023-03-06 RX ORDER — LANCETS 33 GAUGE
EACH MISCELLANEOUS
Qty: 1 | Refills: 3 | Status: ACTIVE | COMMUNITY
Start: 2020-01-06 | End: 1900-01-01

## 2023-03-08 ENCOUNTER — OFFICE (OUTPATIENT)
Dept: URBAN - METROPOLITAN AREA CLINIC 12 | Facility: CLINIC | Age: 78
Setting detail: OPHTHALMOLOGY
End: 2023-03-08
Payer: COMMERCIAL

## 2023-03-08 DIAGNOSIS — H15.101: ICD-10-CM

## 2023-03-08 DIAGNOSIS — L03.213: ICD-10-CM

## 2023-03-08 PROBLEM — E11.9 DIABETES TYPE 2 NO RETINOPATHY ; BOTH EYES: Status: ACTIVE | Noted: 2023-02-17

## 2023-03-08 PROCEDURE — 92014 COMPRE OPH EXAM EST PT 1/>: CPT | Performed by: STUDENT IN AN ORGANIZED HEALTH CARE EDUCATION/TRAINING PROGRAM

## 2023-03-08 ASSESSMENT — REFRACTION_CURRENTRX
OD_VPRISM_DIRECTION: PROGS
OS_VPRISM_DIRECTION: PROGS
OS_AXIS: 078
OD_AXIS: 124
OS_SPHERE: -0.25
OS_CYLINDER: -1.25
OD_OVR_VA: 20/
OS_OVR_VA: 20/
OD_CYLINDER: -0.50
OD_ADD: +3.00
OS_ADD: +3.00
OD_SPHERE: -1.75

## 2023-03-08 ASSESSMENT — VISUAL ACUITY
OD_BCVA: 20/60+2
OS_BCVA: 20/100+1

## 2023-03-08 ASSESSMENT — REFRACTION_MANIFEST
OS_AXIS: 072
OD_SPHERE: +1.75
OS_CYLINDER: -1.50
OS_VA1: 20/40-2
OD_AXIS: 126
OS_SPHERE: PLANO
OS_ADD: +3.00
OD_CYLINDER: -0.25
OD_ADD: +3.00
OD_VA1: 20/40-1

## 2023-03-08 ASSESSMENT — KERATOMETRY
OD_K2POWER_DIOPTERS: 43.75
METHOD_AUTO_MANUAL: AUTO
OS_AXISANGLE_DEGREES: 117
OS_K1POWER_DIOPTERS: 42.50
OS_K2POWER_DIOPTERS: 42.75
OD_K1POWER_DIOPTERS: 42.75
OD_AXISANGLE_DEGREES: 071

## 2023-03-08 ASSESSMENT — REFRACTION_AUTOREFRACTION
OD_AXIS: 159
OS_CYLINDER: -1.00
OS_SPHERE: PLANO
OS_AXIS: 071
OD_SPHERE: -2.25
OD_CYLINDER: -0.50

## 2023-03-08 ASSESSMENT — SPHEQUIV_DERIVED
OD_SPHEQUIV: -2.5
OD_SPHEQUIV: 1.625

## 2023-03-08 ASSESSMENT — CORNEAL DYSTROPHY - POSTERIOR
OS_POSTERIORDYSTROPHY: SEVERE GUTTATA
OD_POSTERIORDYSTROPHY: SEVERE GUTTATA

## 2023-03-08 ASSESSMENT — LID POSITION - PTOSIS
OS_PTOSIS: LUL
OD_PTOSIS: RUL

## 2023-03-08 ASSESSMENT — CONFRONTATIONAL VISUAL FIELD TEST (CVF)
OS_FINDINGS: FULL
OD_FINDINGS: FULL

## 2023-03-08 ASSESSMENT — TONOMETRY
OD_IOP_MMHG: 10
OS_IOP_MMHG: 18

## 2023-03-08 ASSESSMENT — AXIALLENGTH_DERIVED
OD_AL: 23.0628
OD_AL: 24.7082

## 2023-03-14 ENCOUNTER — RX ONLY (RX ONLY)
Age: 78
End: 2023-03-14

## 2023-03-14 ENCOUNTER — OFFICE (OUTPATIENT)
Dept: URBAN - METROPOLITAN AREA CLINIC 12 | Facility: CLINIC | Age: 78
Setting detail: OPHTHALMOLOGY
End: 2023-03-14
Payer: COMMERCIAL

## 2023-03-14 DIAGNOSIS — H15.101: ICD-10-CM

## 2023-03-14 PROCEDURE — 99212 OFFICE O/P EST SF 10 MIN: CPT | Performed by: STUDENT IN AN ORGANIZED HEALTH CARE EDUCATION/TRAINING PROGRAM

## 2023-03-14 ASSESSMENT — REFRACTION_CURRENTRX
OS_ADD: +3.00
OS_SPHERE: -0.25
OD_AXIS: 124
OS_AXIS: 078
OD_ADD: +3.00
OS_OVR_VA: 20/
OD_CYLINDER: -0.50
OD_OVR_VA: 20/
OS_CYLINDER: -1.25
OD_SPHERE: -1.75
OS_VPRISM_DIRECTION: PROGS
OD_VPRISM_DIRECTION: PROGS

## 2023-03-14 ASSESSMENT — REFRACTION_AUTOREFRACTION
OS_AXIS: 072
OD_SPHERE: -2.25
OD_CYLINDER: -0.50
OS_CYLINDER: -0.75
OD_AXIS: 154
OS_SPHERE: PLANO

## 2023-03-14 ASSESSMENT — LID POSITION - PTOSIS
OS_PTOSIS: LUL
OD_PTOSIS: RUL

## 2023-03-14 ASSESSMENT — SPHEQUIV_DERIVED
OD_SPHEQUIV: 1.625
OD_SPHEQUIV: -2.5

## 2023-03-14 ASSESSMENT — CONFRONTATIONAL VISUAL FIELD TEST (CVF)
OD_FINDINGS: FULL
OS_FINDINGS: FULL

## 2023-03-14 ASSESSMENT — KERATOMETRY
OS_K1POWER_DIOPTERS: 42.00
OD_K1POWER_DIOPTERS: 43.00
OD_K2POWER_DIOPTERS: 44.00
METHOD_AUTO_MANUAL: AUTO
OS_K2POWER_DIOPTERS: 42.25
OS_AXISANGLE_DEGREES: 126
OD_AXISANGLE_DEGREES: 081

## 2023-03-14 ASSESSMENT — REFRACTION_MANIFEST
OS_VA1: 20/40-2
OD_CYLINDER: -0.25
OS_ADD: +3.00
OD_VA1: 20/40-1
OS_AXIS: 072
OS_SPHERE: PLANO
OD_SPHERE: +1.75
OD_ADD: +3.00
OD_AXIS: 126
OS_CYLINDER: -1.50

## 2023-03-14 ASSESSMENT — VISUAL ACUITY
OD_BCVA: 20/60
OS_BCVA: 20/60-1

## 2023-03-14 ASSESSMENT — CORNEAL DYSTROPHY - POSTERIOR
OS_POSTERIORDYSTROPHY: SEVERE GUTTATA
OD_POSTERIORDYSTROPHY: SEVERE GUTTATA

## 2023-03-14 ASSESSMENT — AXIALLENGTH_DERIVED
OD_AL: 22.9753
OD_AL: 24.6079

## 2023-03-14 ASSESSMENT — TONOMETRY
OD_IOP_MMHG: 12
OS_IOP_MMHG: 12

## 2023-03-15 RX ORDER — LANCETS 26 GAUGE
EACH MISCELLANEOUS
Qty: 1 | Refills: 0 | Status: ACTIVE | COMMUNITY
Start: 2023-02-28 | End: 1900-01-01

## 2023-03-27 PROBLEM — H52.223 ASTIGMATISM, REGULAR; BOTH EYES: Status: ACTIVE | Noted: 2023-03-27

## 2023-04-03 ENCOUNTER — RX RENEWAL (OUTPATIENT)
Age: 78
End: 2023-04-03

## 2023-04-06 ENCOUNTER — RX RENEWAL (OUTPATIENT)
Age: 78
End: 2023-04-06

## 2023-04-11 ENCOUNTER — OFFICE (OUTPATIENT)
Dept: URBAN - METROPOLITAN AREA CLINIC 12 | Facility: CLINIC | Age: 78
Setting detail: OPHTHALMOLOGY
End: 2023-04-11
Payer: COMMERCIAL

## 2023-04-11 DIAGNOSIS — H15.101: ICD-10-CM

## 2023-04-11 DIAGNOSIS — H52.4: ICD-10-CM

## 2023-04-11 DIAGNOSIS — H18.513: ICD-10-CM

## 2023-04-11 PROBLEM — H52.7 REFRACTIVE ERROR: Status: ACTIVE | Noted: 2023-04-11

## 2023-04-11 PROCEDURE — 92015 DETERMINE REFRACTIVE STATE: CPT | Performed by: STUDENT IN AN ORGANIZED HEALTH CARE EDUCATION/TRAINING PROGRAM

## 2023-04-11 PROCEDURE — 92012 INTRM OPH EXAM EST PATIENT: CPT | Performed by: STUDENT IN AN ORGANIZED HEALTH CARE EDUCATION/TRAINING PROGRAM

## 2023-04-11 ASSESSMENT — REFRACTION_MANIFEST
OS_AXIS: 075
OD_SPHERE: -2.00
OS_SPHERE: -0.50
OS_CYLINDER: -1.25
OS_VA1: 20/40
OD_VA1: 20/40
OD_ADD: +3.00
OD_CYLINDER: SPH
OS_ADD: +3.00

## 2023-04-11 ASSESSMENT — SPHEQUIV_DERIVED
OS_SPHEQUIV: -1.125
OS_SPHEQUIV: -0.375
OD_SPHEQUIV: -2.125

## 2023-04-11 ASSESSMENT — REFRACTION_AUTOREFRACTION
OD_SPHERE: -2.00
OS_AXIS: 73
OS_SPHERE: +0.25
OD_CYLINDER: -0.25
OS_CYLINDER: -1.25
OD_AXIS: 134

## 2023-04-11 ASSESSMENT — KERATOMETRY
OS_K1POWER_DIOPTERS: 42.25
METHOD_AUTO_MANUAL: AUTO
OD_K1POWER_DIOPTERS: 43.50
OS_K2POWER_DIOPTERS: 42.75
OD_AXISANGLE_DEGREES: 74
OS_AXISANGLE_DEGREES: 123
OD_K2POWER_DIOPTERS: 44.25

## 2023-04-11 ASSESSMENT — TONOMETRY
OD_IOP_MMHG: 13
OS_IOP_MMHG: 12

## 2023-04-11 ASSESSMENT — VISUAL ACUITY
OD_BCVA: 20/70
OS_BCVA: 20/70

## 2023-04-11 ASSESSMENT — REFRACTION_CURRENTRX
OD_AXIS: 126
OS_CYLINDER: -1.25
OD_CYLINDER: -0.50
OD_ADD: +3.00
OS_OVR_VA: 20/
OS_SPHERE: -0.25
OS_ADD: +3.00
OS_AXIS: 73
OS_VPRISM_DIRECTION: PROGS
OD_OVR_VA: 20/
OD_VPRISM_DIRECTION: PROGS
OD_SPHERE: -1.75

## 2023-04-11 ASSESSMENT — LID POSITION - PTOSIS
OD_PTOSIS: RUL
OS_PTOSIS: LUL

## 2023-04-11 ASSESSMENT — CONFRONTATIONAL VISUAL FIELD TEST (CVF)
OD_FINDINGS: FULL
OS_FINDINGS: FULL

## 2023-04-11 ASSESSMENT — CORNEAL DYSTROPHY - POSTERIOR
OS_POSTERIORDYSTROPHY: SEVERE GUTTATA
OD_POSTERIORDYSTROPHY: SEVERE GUTTATA

## 2023-04-11 ASSESSMENT — AXIALLENGTH_DERIVED
OS_AL: 24.426
OD_AL: 24.3029
OS_AL: 24.1167

## 2023-04-24 ENCOUNTER — RX RENEWAL (OUTPATIENT)
Age: 78
End: 2023-04-24

## 2023-05-08 PROBLEM — H52.223 ASTIGMATISM, REGULAR; BOTH EYES: Status: ACTIVE | Noted: 2023-05-08

## 2023-06-07 ENCOUNTER — RX RENEWAL (OUTPATIENT)
Age: 78
End: 2023-06-07

## 2023-06-21 ENCOUNTER — RX RENEWAL (OUTPATIENT)
Age: 78
End: 2023-06-21

## 2023-06-28 ENCOUNTER — RX RENEWAL (OUTPATIENT)
Age: 78
End: 2023-06-28

## 2023-07-17 ENCOUNTER — RX RENEWAL (OUTPATIENT)
Age: 78
End: 2023-07-17

## 2023-07-18 ENCOUNTER — OFFICE (OUTPATIENT)
Dept: URBAN - METROPOLITAN AREA CLINIC 6 | Facility: CLINIC | Age: 78
Setting detail: OPHTHALMOLOGY
End: 2023-07-18
Payer: COMMERCIAL

## 2023-07-18 DIAGNOSIS — H18.513: ICD-10-CM

## 2023-07-18 PROCEDURE — 99213 OFFICE O/P EST LOW 20 MIN: CPT | Performed by: OPHTHALMOLOGY

## 2023-07-18 PROCEDURE — 92286 ANT SGM IMG I&R SPECLR MIC: CPT | Performed by: OPHTHALMOLOGY

## 2023-07-18 ASSESSMENT — REFRACTION_AUTOREFRACTION
OS_CYLINDER: -1.25
OD_SPHERE: -1.50
OS_SPHERE: +0.25
OD_CYLINDER: -0.50
OS_AXIS: 072
OD_AXIS: 113

## 2023-07-18 ASSESSMENT — REFRACTION_MANIFEST
OS_SPHERE: -0.50
OD_SPHERE: -2.00
OS_AXIS: 075
OS_CYLINDER: -1.25
OD_VA1: 20/40
OD_ADD: +3.00
OS_ADD: +3.00
OD_CYLINDER: SPH
OS_VA1: 20/40

## 2023-07-18 ASSESSMENT — KERATOMETRY
OD_K2POWER_DIOPTERS: 43.00
OS_K1POWER_DIOPTERS: 42.00
OD_AXISANGLE_DEGREES: 063
OS_AXISANGLE_DEGREES: 110
OD_K1POWER_DIOPTERS: 42.75
METHOD_AUTO_MANUAL: AUTO
OS_K2POWER_DIOPTERS: 42.50

## 2023-07-18 ASSESSMENT — TONOMETRY
OD_IOP_MMHG: 16
OS_IOP_MMHG: 17

## 2023-07-18 ASSESSMENT — CONFRONTATIONAL VISUAL FIELD TEST (CVF)
OS_FINDINGS: FULL
OD_FINDINGS: FULL

## 2023-07-18 ASSESSMENT — LID POSITION - PTOSIS
OS_PTOSIS: LUL
OD_PTOSIS: RUL

## 2023-07-18 ASSESSMENT — REFRACTION_CURRENTRX
OD_CYLINDER: -0.50
OD_ADD: +3.00
OS_AXIS: 73
OD_OVR_VA: 20/
OS_OVR_VA: 20/
OD_VPRISM_DIRECTION: PROGS
OS_ADD: +3.00
OS_VPRISM_DIRECTION: PROGS
OD_AXIS: 126
OD_SPHERE: -1.75
OS_CYLINDER: -1.25
OS_SPHERE: -0.25

## 2023-07-18 ASSESSMENT — AXIALLENGTH_DERIVED
OD_AL: 24.5399
OS_AL: 24.5248
OS_AL: 24.2131

## 2023-07-18 ASSESSMENT — VISUAL ACUITY
OD_BCVA: 20/60
OS_BCVA: 20/50-

## 2023-07-18 ASSESSMENT — SPHEQUIV_DERIVED
OS_SPHEQUIV: -1.125
OD_SPHEQUIV: -1.75
OS_SPHEQUIV: -0.375

## 2023-07-18 ASSESSMENT — CORNEAL DYSTROPHY - POSTERIOR
OD_POSTERIORDYSTROPHY: SEVERE GUTTATA
OS_POSTERIORDYSTROPHY: SEVERE GUTTATA

## 2023-07-20 PROBLEM — H52.223 ASTIGMATISM, REGULAR; BOTH EYES: Status: ACTIVE | Noted: 2023-07-20

## 2023-08-17 ENCOUNTER — RX RENEWAL (OUTPATIENT)
Age: 78
End: 2023-08-17

## 2023-08-28 ENCOUNTER — RX RENEWAL (OUTPATIENT)
Age: 78
End: 2023-08-28

## 2023-08-30 ENCOUNTER — RX RENEWAL (OUTPATIENT)
Age: 78
End: 2023-08-30

## 2023-09-06 ENCOUNTER — RX RENEWAL (OUTPATIENT)
Age: 78
End: 2023-09-06

## 2023-09-12 ENCOUNTER — RX RENEWAL (OUTPATIENT)
Age: 78
End: 2023-09-12

## 2023-10-03 ENCOUNTER — APPOINTMENT (OUTPATIENT)
Dept: FAMILY MEDICINE | Facility: CLINIC | Age: 78
End: 2023-10-03
Payer: MEDICARE

## 2023-10-03 VITALS
DIASTOLIC BLOOD PRESSURE: 70 MMHG | HEIGHT: 63 IN | OXYGEN SATURATION: 97 % | BODY MASS INDEX: 29.77 KG/M2 | WEIGHT: 168 LBS | SYSTOLIC BLOOD PRESSURE: 114 MMHG | HEART RATE: 71 BPM | RESPIRATION RATE: 14 BRPM

## 2023-10-03 DIAGNOSIS — M85.80 OTHER SPECIFIED DISORDERS OF BONE DENSITY AND STRUCTURE, UNSPECIFIED SITE: ICD-10-CM

## 2023-10-03 PROCEDURE — 83036 HEMOGLOBIN GLYCOSYLATED A1C: CPT | Mod: QW

## 2023-10-03 PROCEDURE — 36415 COLL VENOUS BLD VENIPUNCTURE: CPT

## 2023-10-03 PROCEDURE — 99214 OFFICE O/P EST MOD 30 MIN: CPT | Mod: 25

## 2023-10-13 LAB
ALBUMIN SERPL ELPH-MCNC: 4.4 G/DL
ALP BLD-CCNC: 59 U/L
ALT SERPL-CCNC: 20 U/L
ANION GAP SERPL CALC-SCNC: 14 MMOL/L
AST SERPL-CCNC: 17 U/L
BILIRUB SERPL-MCNC: 0.4 MG/DL
BUN SERPL-MCNC: 19 MG/DL
CALCIUM SERPL-MCNC: 10 MG/DL
CHLORIDE SERPL-SCNC: 101 MMOL/L
CO2 SERPL-SCNC: 25 MMOL/L
CREAT SERPL-MCNC: 0.67 MG/DL
EGFR: 91 ML/MIN/1.73M2
GLUCOSE SERPL-MCNC: 178 MG/DL
POTASSIUM SERPL-SCNC: 4.2 MMOL/L
PROT SERPL-MCNC: 7.1 G/DL
SODIUM SERPL-SCNC: 140 MMOL/L

## 2023-11-09 ENCOUNTER — RX RENEWAL (OUTPATIENT)
Age: 78
End: 2023-11-09

## 2023-11-14 ENCOUNTER — OUTPATIENT (OUTPATIENT)
Dept: OUTPATIENT SERVICES | Facility: HOSPITAL | Age: 78
LOS: 1 days | End: 2023-11-14
Payer: MEDICARE

## 2023-11-14 ENCOUNTER — APPOINTMENT (OUTPATIENT)
Dept: RADIOLOGY | Facility: CLINIC | Age: 78
End: 2023-11-14
Payer: MEDICARE

## 2023-11-14 DIAGNOSIS — Z00.8 ENCOUNTER FOR OTHER GENERAL EXAMINATION: ICD-10-CM

## 2023-11-14 PROCEDURE — 77080 DXA BONE DENSITY AXIAL: CPT

## 2023-11-14 PROCEDURE — 77080 DXA BONE DENSITY AXIAL: CPT | Mod: 26

## 2023-11-22 PROBLEM — E11.9 DIABETES TYPE 2 NO RETINOPATHY ; BOTH EYES: Status: ACTIVE | Noted: 2023-11-22

## 2023-12-29 RX ORDER — ALENDRONATE SODIUM 70 MG/1
70 TABLET ORAL
Qty: 14 | Refills: 2 | Status: ACTIVE | COMMUNITY
Start: 2023-12-29 | End: 1900-01-01

## 2024-01-08 ENCOUNTER — NON-APPOINTMENT (OUTPATIENT)
Age: 79
End: 2024-01-08

## 2024-01-09 ENCOUNTER — APPOINTMENT (OUTPATIENT)
Dept: FAMILY MEDICINE | Facility: CLINIC | Age: 79
End: 2024-01-09
Payer: MEDICARE

## 2024-01-09 ENCOUNTER — RESULT CHARGE (OUTPATIENT)
Age: 79
End: 2024-01-09

## 2024-01-09 VITALS
OXYGEN SATURATION: 98 % | WEIGHT: 166 LBS | DIASTOLIC BLOOD PRESSURE: 66 MMHG | HEIGHT: 63 IN | TEMPERATURE: 98.3 F | RESPIRATION RATE: 14 BRPM | BODY MASS INDEX: 29.41 KG/M2 | SYSTOLIC BLOOD PRESSURE: 122 MMHG | HEART RATE: 70 BPM

## 2024-01-09 DIAGNOSIS — Z00.00 ENCOUNTER FOR GENERAL ADULT MEDICAL EXAMINATION W/OUT ABNORMAL FINDINGS: ICD-10-CM

## 2024-01-09 DIAGNOSIS — Z12.39 ENCOUNTER FOR OTHER SCREENING FOR MALIGNANT NEOPLASM OF BREAST: ICD-10-CM

## 2024-01-09 PROCEDURE — 36415 COLL VENOUS BLD VENIPUNCTURE: CPT

## 2024-01-09 PROCEDURE — 83036 HEMOGLOBIN GLYCOSYLATED A1C: CPT | Mod: QW

## 2024-01-09 PROCEDURE — 99397 PER PM REEVAL EST PAT 65+ YR: CPT | Mod: 25

## 2024-01-09 RX ORDER — GABAPENTIN 100 MG/1
100 CAPSULE ORAL
Qty: 90 | Refills: 1 | Status: ACTIVE | COMMUNITY
Start: 2021-08-31 | End: 1900-01-01

## 2024-01-09 RX ORDER — BENZONATATE 200 MG/1
200 CAPSULE ORAL 3 TIMES DAILY
Qty: 30 | Refills: 1 | Status: COMPLETED | COMMUNITY
Start: 2023-02-09 | End: 2024-01-09

## 2024-01-11 ENCOUNTER — NON-APPOINTMENT (OUTPATIENT)
Age: 79
End: 2024-01-11

## 2024-01-11 DIAGNOSIS — E56.9 VITAMIN DEFICIENCY, UNSPECIFIED: ICD-10-CM

## 2024-01-16 ENCOUNTER — OFFICE (OUTPATIENT)
Dept: URBAN - METROPOLITAN AREA CLINIC 6 | Facility: CLINIC | Age: 79
Setting detail: OPHTHALMOLOGY
End: 2024-01-16
Payer: COMMERCIAL

## 2024-01-16 DIAGNOSIS — H18.513: ICD-10-CM

## 2024-01-16 DIAGNOSIS — H01.004: ICD-10-CM

## 2024-01-16 DIAGNOSIS — H40.013: ICD-10-CM

## 2024-01-16 DIAGNOSIS — H01.005: ICD-10-CM

## 2024-01-16 DIAGNOSIS — H02.403: ICD-10-CM

## 2024-01-16 DIAGNOSIS — H01.001: ICD-10-CM

## 2024-01-16 DIAGNOSIS — H43.393: ICD-10-CM

## 2024-01-16 DIAGNOSIS — H01.002: ICD-10-CM

## 2024-01-16 DIAGNOSIS — H26.493: ICD-10-CM

## 2024-01-16 DIAGNOSIS — H35.3131: ICD-10-CM

## 2024-01-16 DIAGNOSIS — H43.813: ICD-10-CM

## 2024-01-16 LAB
25(OH)D3 SERPL-MCNC: 24.3 NG/ML
ALBUMIN SERPL ELPH-MCNC: 4.6 G/DL
ALP BLD-CCNC: 63 U/L
ALT SERPL-CCNC: 19 U/L
ANION GAP SERPL CALC-SCNC: 13 MMOL/L
AST SERPL-CCNC: 17 U/L
BILIRUB SERPL-MCNC: 0.4 MG/DL
BUN SERPL-MCNC: 20 MG/DL
CALCIUM SERPL-MCNC: 10.2 MG/DL
CHLORIDE SERPL-SCNC: 100 MMOL/L
CHOLEST SERPL-MCNC: 174 MG/DL
CO2 SERPL-SCNC: 27 MMOL/L
CREAT SERPL-MCNC: 0.61 MG/DL
EGFR: 93 ML/MIN/1.73M2
GLUCOSE SERPL-MCNC: 156 MG/DL
HCT VFR BLD CALC: 47.3 %
HDLC SERPL-MCNC: 70 MG/DL
HGB BLD-MCNC: 15.4 G/DL
LDLC SERPL CALC-MCNC: 80 MG/DL
MCHC RBC-ENTMCNC: 29.5 PG
MCHC RBC-ENTMCNC: 32.6 GM/DL
MCV RBC AUTO: 90.6 FL
NONHDLC SERPL-MCNC: 105 MG/DL
PLATELET # BLD AUTO: 196 K/UL
POTASSIUM SERPL-SCNC: 4.4 MMOL/L
PROT SERPL-MCNC: 7.2 G/DL
RBC # BLD: 5.22 M/UL
RBC # FLD: 12.2 %
SODIUM SERPL-SCNC: 140 MMOL/L
TRIGL SERPL-MCNC: 147 MG/DL
TSH SERPL-ACNC: 1.18 UIU/ML
WBC # FLD AUTO: 4.05 K/UL

## 2024-01-16 PROCEDURE — 92250 FUNDUS PHOTOGRAPHY W/I&R: CPT | Performed by: OPHTHALMOLOGY

## 2024-01-16 PROCEDURE — 76514 ECHO EXAM OF EYE THICKNESS: CPT | Performed by: OPHTHALMOLOGY

## 2024-01-16 PROCEDURE — 92014 COMPRE OPH EXAM EST PT 1/>: CPT | Performed by: OPHTHALMOLOGY

## 2024-01-16 PROCEDURE — 92083 EXTENDED VISUAL FIELD XM: CPT | Performed by: OPHTHALMOLOGY

## 2024-01-16 ASSESSMENT — LID POSITION - PTOSIS
OD_PTOSIS: RUL
OS_PTOSIS: LUL

## 2024-01-16 ASSESSMENT — REFRACTION_MANIFEST
OU_VA: 20/40-
OD_AXIS: 120
OS_VA1: 20/50-
OD_CYLINDER: -0.50
OD_VA1: 20/50-
OS_ADD: +3.25
OS_SPHERE: -0.50
OS_CYLINDER: -0.50
OD_ADD: +3.25
OD_SPHERE: -1.75
OS_AXIS: 050

## 2024-01-16 ASSESSMENT — REFRACTION_CURRENTRX
OD_OVR_VA: 20/
OS_VPRISM_DIRECTION: PROGS
OD_ADD: +3.00
OS_OVR_VA: 20/
OS_AXIS: 090
OD_SPHERE: -2.00
OS_SPHERE: -0.25
OD_CYLINDER: SPHERE
OD_VPRISM_DIRECTION: PROGS
OS_CYLINDER: -1.25
OS_ADD: +3.00

## 2024-01-16 ASSESSMENT — REFRACTION_AUTOREFRACTION
OS_AXIS: 047
OD_SPHERE: -1.75
OD_CYLINDER: -0.50
OD_AXIS: 120
OS_SPHERE: -0.50
OS_CYLINDER: -0.50

## 2024-01-16 ASSESSMENT — LID EXAM ASSESSMENTS
OS_BLEPHARITIS: LLL LUL
OD_BLEPHARITIS: RLL RUL

## 2024-01-16 ASSESSMENT — CORNEAL DYSTROPHY - POSTERIOR
OD_POSTERIORDYSTROPHY: 3+ GUTTATA
OS_POSTERIORDYSTROPHY: 3+ GUTTATA

## 2024-01-16 ASSESSMENT — CONFRONTATIONAL VISUAL FIELD TEST (CVF)
OD_FINDINGS: FULL
OS_FINDINGS: FULL

## 2024-01-16 ASSESSMENT — SPHEQUIV_DERIVED
OS_SPHEQUIV: -0.75
OD_SPHEQUIV: -2
OD_SPHEQUIV: -2
OS_SPHEQUIV: -0.75

## 2024-01-19 RX ORDER — BLOOD-GLUCOSE SENSOR
EACH MISCELLANEOUS
Qty: 6 | Refills: 6 | Status: ACTIVE | COMMUNITY
Start: 2024-01-09

## 2024-01-23 ENCOUNTER — APPOINTMENT (OUTPATIENT)
Dept: MAMMOGRAPHY | Facility: CLINIC | Age: 79
End: 2024-01-23
Payer: MEDICARE

## 2024-01-23 ENCOUNTER — OUTPATIENT (OUTPATIENT)
Dept: OUTPATIENT SERVICES | Facility: HOSPITAL | Age: 79
LOS: 1 days | End: 2024-01-23
Payer: MEDICARE

## 2024-01-23 ENCOUNTER — RESULT REVIEW (OUTPATIENT)
Age: 79
End: 2024-01-23

## 2024-01-23 DIAGNOSIS — Z00.8 ENCOUNTER FOR OTHER GENERAL EXAMINATION: ICD-10-CM

## 2024-01-23 PROCEDURE — 77067 SCR MAMMO BI INCL CAD: CPT

## 2024-01-23 PROCEDURE — 77067 SCR MAMMO BI INCL CAD: CPT | Mod: 26

## 2024-01-23 PROCEDURE — 77063 BREAST TOMOSYNTHESIS BI: CPT

## 2024-01-23 PROCEDURE — 77063 BREAST TOMOSYNTHESIS BI: CPT | Mod: 26

## 2024-02-12 ENCOUNTER — RX RENEWAL (OUTPATIENT)
Age: 79
End: 2024-02-12

## 2024-02-12 RX ORDER — LANCETS 28 GAUGE
EACH MISCELLANEOUS
Qty: 300 | Refills: 3 | Status: ACTIVE | COMMUNITY
Start: 2023-04-24 | End: 1900-01-01

## 2024-02-19 ENCOUNTER — RX RENEWAL (OUTPATIENT)
Age: 79
End: 2024-02-19

## 2024-02-19 RX ORDER — METFORMIN ER 500 MG 500 MG/1
500 TABLET ORAL
Qty: 360 | Refills: 3 | Status: ACTIVE | COMMUNITY
Start: 2023-01-24 | End: 1900-01-01

## 2024-03-29 PROBLEM — E11.9 DIABETES TYPE 2 NO RETINOPATHY ; BOTH EYES: Status: ACTIVE | Noted: 2024-03-29

## 2024-04-08 ENCOUNTER — NON-APPOINTMENT (OUTPATIENT)
Age: 79
End: 2024-04-08

## 2024-04-09 ENCOUNTER — RX RENEWAL (OUTPATIENT)
Age: 79
End: 2024-04-09

## 2024-04-09 ENCOUNTER — APPOINTMENT (OUTPATIENT)
Dept: FAMILY MEDICINE | Facility: CLINIC | Age: 79
End: 2024-04-09
Payer: MEDICARE

## 2024-04-09 VITALS
HEIGHT: 63 IN | OXYGEN SATURATION: 98 % | HEART RATE: 65 BPM | DIASTOLIC BLOOD PRESSURE: 70 MMHG | WEIGHT: 167 LBS | BODY MASS INDEX: 29.59 KG/M2 | RESPIRATION RATE: 15 BRPM | TEMPERATURE: 97.7 F | SYSTOLIC BLOOD PRESSURE: 128 MMHG

## 2024-04-09 DIAGNOSIS — M81.0 AGE-RELATED OSTEOPOROSIS W/OUT CURRENT PATHOLOGICAL FRACTURE: ICD-10-CM

## 2024-04-09 DIAGNOSIS — Z79.899 OTHER LONG TERM (CURRENT) DRUG THERAPY: ICD-10-CM

## 2024-04-09 DIAGNOSIS — E78.00 PURE HYPERCHOLESTEROLEMIA, UNSPECIFIED: ICD-10-CM

## 2024-04-09 DIAGNOSIS — I10 ESSENTIAL (PRIMARY) HYPERTENSION: ICD-10-CM

## 2024-04-09 DIAGNOSIS — E11.9 TYPE 2 DIABETES MELLITUS W/OUT COMPLICATIONS: ICD-10-CM

## 2024-04-09 DIAGNOSIS — G47.09 OTHER INSOMNIA: ICD-10-CM

## 2024-04-09 DIAGNOSIS — D58.2 OTHER HEMOGLOBINOPATHIES: ICD-10-CM

## 2024-04-09 PROCEDURE — 99214 OFFICE O/P EST MOD 30 MIN: CPT

## 2024-04-09 PROCEDURE — G2211 COMPLEX E/M VISIT ADD ON: CPT

## 2024-04-09 PROCEDURE — 83036 HEMOGLOBIN GLYCOSYLATED A1C: CPT | Mod: QW

## 2024-04-09 RX ORDER — MELATONIN 3 MG
3 CAPSULE ORAL
Qty: 30 | Refills: 1 | Status: ACTIVE | COMMUNITY
Start: 2020-09-02 | End: 1900-01-01

## 2024-04-09 RX ORDER — LOSARTAN POTASSIUM 100 MG/1
100 TABLET, FILM COATED ORAL
Qty: 90 | Refills: 3 | Status: ACTIVE | COMMUNITY
Start: 2019-11-26 | End: 1900-01-01

## 2024-04-09 RX ORDER — ERGOCALCIFEROL 1.25 MG/1
1.25 MG CAPSULE, LIQUID FILLED ORAL
Qty: 12 | Refills: 0 | Status: ACTIVE | COMMUNITY
Start: 2024-01-11 | End: 1900-01-01

## 2024-04-09 RX ORDER — SIMVASTATIN 10 MG/1
10 TABLET, FILM COATED ORAL
Qty: 90 | Refills: 3 | Status: ACTIVE | COMMUNITY
Start: 2021-02-22 | End: 1900-01-01

## 2024-04-09 RX ORDER — PIOGLITAZONE HYDROCHLORIDE 15 MG/1
15 TABLET ORAL
Qty: 90 | Refills: 0 | Status: ACTIVE | COMMUNITY
Start: 2020-01-08 | End: 1900-01-01

## 2024-04-09 NOTE — HISTORY OF PRESENT ILLNESS
[FreeTextEntry1] : Follow up, blood sugar check [de-identified] : This is a 77-year-old female past medical history of hypertension, osteopenia, type 2 diabetes, lumbago, diverticulosis, presenting to the office for diabetes check.

## 2024-04-09 NOTE — HEALTH RISK ASSESSMENT
[No] : In the past 12 months have you used drugs other than those required for medical reasons? No [No falls in past year] : Patient reported no falls in the past year [0] : 2) Feeling down, depressed, or hopeless: Not at all (0) [PHQ-2 Negative - No further assessment needed] : PHQ-2 Negative - No further assessment needed [With Patient/Caregiver] : , with patient/caregiver [Reviewed updated] : Reviewed, updated [Aggressive treatment] : aggressive treatment [Former] : Former [0-4] : 0-4 [Audit-CScore] : 0 [de-identified] : none [de-identified] : salads, less rice and bread [[id number]] : 0 [AdvancecareDate] : 01/24

## 2024-04-09 NOTE — ASSESSMENT
[FreeTextEntry1] : 78-year-old female past medical history of hypertension, osteopenia, type 2 diabetes, lumbago, diverticulosis, presenting to the office for diabetes check  MSK/NEURO: RIGHT LE numbness, lumbago, RIGHT shoulder pain s/p fall -Continue Naproxen 500 mg bid prn -Continue Gabapentin 100 mg 2 tabs TID, e-refilled  Endocrine: History of type 2 diabetes. -Hemoglobin A1c 7.9--> 7.8 --> 7.8 --> 8.0 --> 7.2 --> 7.5 --> 8.4 --> 7.8 --> 7.5 POCT today -Metformin ER 1000 mg daily, Glipizide ER 5 mg once daily, Pioglitazone 15 mg qd, Jardiance 25 mg once daily. -Fasting blood sugars goal to be less than 120. -Last diabetic eye exam April 2023 no retinopathy.  Cardiovascular: History hypertension. -Blood pressure stable -Continue losartan 100 mg, hydrochlorothiazide 25 mg once daily. -Continue simvastatin 40 mg at bedtime.  Rheumatology: History of osteopenia, Low vitamin D level -Continue calcium and vitamin D supplementation, e-refilled. -Last bone density testing done Oct 2023, no changes, Osteoporosis  Neuro: Insomnia -Stable on Melatonin 3 mg tab QHS prn  HEME: Elevated Hemoglobin -Improved / resolved  Health care maintenance: -Patient had a mammogram done in Jan 2023, BI-RADS 1, referral provided -GYN Dr Rausch. -Colonoscopy screening in June 2018, f/w Diverticulosis, no longer need to get Colonoscopy screening. -Bone density testing Nov 2021 found with osteopenia; referral provided. -Patient deferred influenza vaccine, Pneumonia vaccine and zoster vaccine. -TDaP vaccine given in April 2017. -Ophthalmology 04/23, no DR, referral provided. -Covid vaccination completed 5/14/21, 6/4/21, 12/21/21.

## 2024-04-11 ENCOUNTER — RX RENEWAL (OUTPATIENT)
Age: 79
End: 2024-04-11

## 2024-04-11 RX ORDER — BLOOD SUGAR DIAGNOSTIC
STRIP MISCELLANEOUS
Qty: 200 | Refills: 2 | Status: ACTIVE | COMMUNITY
Start: 2022-05-20 | End: 1900-01-01

## 2024-04-29 PROBLEM — H52.223 ASTIGMATISM, REGULAR; BOTH EYES: Status: ACTIVE | Noted: 2024-04-29

## 2024-05-28 ENCOUNTER — OFFICE (OUTPATIENT)
Dept: URBAN - METROPOLITAN AREA CLINIC 6 | Facility: CLINIC | Age: 79
Setting detail: OPHTHALMOLOGY
End: 2024-05-28
Payer: COMMERCIAL

## 2024-05-28 DIAGNOSIS — H52.13: ICD-10-CM

## 2024-05-28 DIAGNOSIS — H18.513: ICD-10-CM

## 2024-05-28 DIAGNOSIS — H35.3131: ICD-10-CM

## 2024-05-28 PROBLEM — H43.393 VITREOUS FLOATERS ; BOTH EYES: Status: ACTIVE | Noted: 2024-05-28

## 2024-05-28 PROBLEM — H02.403 PTOSIS OF EYELID, UNSPECIFIED; BOTH EYES: Status: ACTIVE | Noted: 2024-05-28

## 2024-05-28 PROCEDURE — 99214 OFFICE O/P EST MOD 30 MIN: CPT | Performed by: OPHTHALMOLOGY

## 2024-05-28 PROCEDURE — 92134 CPTRZ OPH DX IMG PST SGM RTA: CPT | Performed by: OPHTHALMOLOGY

## 2024-05-28 PROCEDURE — 76514 ECHO EXAM OF EYE THICKNESS: CPT | Performed by: OPHTHALMOLOGY

## 2024-05-28 PROCEDURE — 92015 DETERMINE REFRACTIVE STATE: CPT | Performed by: OPHTHALMOLOGY

## 2024-05-28 ASSESSMENT — CONFRONTATIONAL VISUAL FIELD TEST (CVF)
OS_FINDINGS: FULL
OD_FINDINGS: FULL

## 2024-05-28 ASSESSMENT — LID POSITION - PTOSIS
OD_PTOSIS: RUL
OS_PTOSIS: LUL

## 2024-05-28 ASSESSMENT — LID EXAM ASSESSMENTS
OS_BLEPHARITIS: LLL LUL
OD_BLEPHARITIS: RLL RUL

## 2024-06-18 RX ORDER — EMPAGLIFLOZIN 25 MG/1
25 TABLET, FILM COATED ORAL
Qty: 90 | Refills: 0 | Status: ACTIVE | COMMUNITY
Start: 2022-06-14 | End: 1900-01-01

## 2024-06-24 PROBLEM — H52.223 ASTIGMATISM, REGULAR; BOTH EYES: Status: ACTIVE | Noted: 2024-06-24

## 2024-07-01 ENCOUNTER — RX RENEWAL (OUTPATIENT)
Age: 79
End: 2024-07-01

## 2024-07-09 ENCOUNTER — APPOINTMENT (OUTPATIENT)
Dept: FAMILY MEDICINE | Facility: CLINIC | Age: 79
End: 2024-07-09
Payer: MEDICARE

## 2024-07-09 VITALS
DIASTOLIC BLOOD PRESSURE: 74 MMHG | SYSTOLIC BLOOD PRESSURE: 128 MMHG | BODY MASS INDEX: 29.95 KG/M2 | RESPIRATION RATE: 14 BRPM | HEIGHT: 63 IN | HEART RATE: 72 BPM | WEIGHT: 169 LBS | OXYGEN SATURATION: 98 % | TEMPERATURE: 98 F

## 2024-07-09 DIAGNOSIS — I10 ESSENTIAL (PRIMARY) HYPERTENSION: ICD-10-CM

## 2024-07-09 DIAGNOSIS — E11.9 TYPE 2 DIABETES MELLITUS W/OUT COMPLICATIONS: ICD-10-CM

## 2024-07-09 DIAGNOSIS — M54.50 LOW BACK PAIN, UNSPECIFIED: ICD-10-CM

## 2024-07-09 DIAGNOSIS — Z87.898 PERSONAL HISTORY OF OTHER SPECIFIED CONDITIONS: ICD-10-CM

## 2024-07-09 DIAGNOSIS — Z79.899 OTHER LONG TERM (CURRENT) DRUG THERAPY: ICD-10-CM

## 2024-07-09 DIAGNOSIS — E78.00 PURE HYPERCHOLESTEROLEMIA, UNSPECIFIED: ICD-10-CM

## 2024-07-09 DIAGNOSIS — D58.2 OTHER HEMOGLOBINOPATHIES: ICD-10-CM

## 2024-07-09 DIAGNOSIS — M85.80 OTHER SPECIFIED DISORDERS OF BONE DENSITY AND STRUCTURE, UNSPECIFIED SITE: ICD-10-CM

## 2024-07-09 DIAGNOSIS — R25.2 CRAMP AND SPASM: ICD-10-CM

## 2024-07-09 DIAGNOSIS — G47.09 OTHER INSOMNIA: ICD-10-CM

## 2024-07-09 PROCEDURE — G2211 COMPLEX E/M VISIT ADD ON: CPT

## 2024-07-09 PROCEDURE — 36415 COLL VENOUS BLD VENIPUNCTURE: CPT

## 2024-07-09 PROCEDURE — 99214 OFFICE O/P EST MOD 30 MIN: CPT

## 2024-07-09 PROCEDURE — 83036 HEMOGLOBIN GLYCOSYLATED A1C: CPT | Mod: QW

## 2024-07-09 RX ORDER — BLOOD-GLUCOSE SENSOR
EACH MISCELLANEOUS
Qty: 6 | Refills: 3 | Status: ACTIVE | COMMUNITY
Start: 2024-07-09 | End: 1900-01-01

## 2024-07-11 LAB
ALBUMIN SERPL ELPH-MCNC: 4.3 G/DL
ALP BLD-CCNC: 56 U/L
ALT SERPL-CCNC: 18 U/L
ANION GAP SERPL CALC-SCNC: 14 MMOL/L
AST SERPL-CCNC: 15 U/L
BILIRUB SERPL-MCNC: 0.4 MG/DL
BUN SERPL-MCNC: 17 MG/DL
CALCIUM SERPL-MCNC: 9.6 MG/DL
CHLORIDE SERPL-SCNC: 101 MMOL/L
CREAT SERPL-MCNC: 0.59 MG/DL
EGFR: 93 ML/MIN/1.73M2
GLUCOSE SERPL-MCNC: 229 MG/DL
PROT SERPL-MCNC: 6.7 G/DL
SODIUM SERPL-SCNC: 141 MMOL/L

## 2024-07-31 PROBLEM — E11.9 DIABETES TYPE 2 NO RETINOPATHY ; BOTH EYES: Status: ACTIVE | Noted: 2024-07-31

## 2024-09-09 ENCOUNTER — RX RENEWAL (OUTPATIENT)
Age: 79
End: 2024-09-09

## 2024-10-08 ENCOUNTER — NON-APPOINTMENT (OUTPATIENT)
Age: 79
End: 2024-10-08

## 2024-10-08 DIAGNOSIS — B35.1 TINEA UNGUIUM: ICD-10-CM

## 2024-10-08 DIAGNOSIS — E11.40 TYPE 2 DIABETES MELLITUS WITH DIABETIC NEUROPATHY, UNSPECIFIED: ICD-10-CM

## 2024-10-08 RX ORDER — OXICONAZOLE NITRATE 10 MG/G
1 CREAM TOPICAL
Refills: 0 | Status: ACTIVE | COMMUNITY

## 2024-10-08 RX ORDER — LIDOCAINE AND PRILOCAINE 25; 25 MG/G; MG/G
2.5-2.5 CREAM TOPICAL
Refills: 0 | Status: ACTIVE | COMMUNITY

## 2024-10-24 ENCOUNTER — RX RENEWAL (OUTPATIENT)
Age: 79
End: 2024-10-24

## 2024-10-29 ENCOUNTER — APPOINTMENT (OUTPATIENT)
Dept: FAMILY MEDICINE | Facility: CLINIC | Age: 79
End: 2024-10-29
Payer: MEDICARE

## 2024-10-29 VITALS
OXYGEN SATURATION: 98 % | RESPIRATION RATE: 14 BRPM | HEART RATE: 78 BPM | HEIGHT: 63 IN | WEIGHT: 161 LBS | DIASTOLIC BLOOD PRESSURE: 78 MMHG | SYSTOLIC BLOOD PRESSURE: 140 MMHG | BODY MASS INDEX: 28.53 KG/M2

## 2024-10-29 DIAGNOSIS — E11.40 TYPE 2 DIABETES MELLITUS WITH DIABETIC NEUROPATHY, UNSPECIFIED: ICD-10-CM

## 2024-10-29 DIAGNOSIS — I10 ESSENTIAL (PRIMARY) HYPERTENSION: ICD-10-CM

## 2024-10-29 DIAGNOSIS — D58.2 OTHER HEMOGLOBINOPATHIES: ICD-10-CM

## 2024-10-29 DIAGNOSIS — E56.9 VITAMIN DEFICIENCY, UNSPECIFIED: ICD-10-CM

## 2024-10-29 DIAGNOSIS — D22.9 MELANOCYTIC NEVI, UNSPECIFIED: ICD-10-CM

## 2024-10-29 DIAGNOSIS — N32.81 OVERACTIVE BLADDER: ICD-10-CM

## 2024-10-29 DIAGNOSIS — E78.00 PURE HYPERCHOLESTEROLEMIA, UNSPECIFIED: ICD-10-CM

## 2024-10-29 PROCEDURE — 83036 HEMOGLOBIN GLYCOSYLATED A1C: CPT | Mod: QW

## 2024-10-29 PROCEDURE — G2211 COMPLEX E/M VISIT ADD ON: CPT

## 2024-10-29 PROCEDURE — 99214 OFFICE O/P EST MOD 30 MIN: CPT

## 2024-10-29 RX ORDER — SOLIFENACIN SUCCINATE 10 MG/1
10 TABLET ORAL DAILY
Qty: 90 | Refills: 1 | Status: ACTIVE | COMMUNITY
Start: 2024-10-29 | End: 1900-01-01

## 2024-11-05 ENCOUNTER — APPOINTMENT (OUTPATIENT)
Age: 79
End: 2024-11-05

## 2024-11-05 VITALS — WEIGHT: 161 LBS | BODY MASS INDEX: 28.53 KG/M2 | HEIGHT: 63 IN

## 2024-11-05 DIAGNOSIS — E11.9 TYPE 2 DIABETES MELLITUS W/OUT COMPLICATIONS: ICD-10-CM

## 2024-11-05 PROCEDURE — 11721 DEBRIDE NAIL 6 OR MORE: CPT

## 2024-11-05 PROCEDURE — 99213 OFFICE O/P EST LOW 20 MIN: CPT | Mod: 25

## 2024-11-05 PROCEDURE — 99203 OFFICE O/P NEW LOW 30 MIN: CPT | Mod: 25

## 2024-11-18 ENCOUNTER — RX RENEWAL (OUTPATIENT)
Age: 79
End: 2024-11-18

## 2024-12-03 ENCOUNTER — OFFICE (OUTPATIENT)
Dept: URBAN - METROPOLITAN AREA CLINIC 6 | Facility: CLINIC | Age: 79
Setting detail: OPHTHALMOLOGY
End: 2024-12-03
Payer: COMMERCIAL

## 2024-12-03 DIAGNOSIS — H18.513: ICD-10-CM

## 2024-12-03 DIAGNOSIS — H40.013: ICD-10-CM

## 2024-12-03 DIAGNOSIS — H26.493: ICD-10-CM

## 2024-12-03 DIAGNOSIS — H35.3131: ICD-10-CM

## 2024-12-03 PROBLEM — H02.403 PTOSIS OF EYELID, UNSPECIFIED; BOTH EYES: Status: ACTIVE | Noted: 2024-12-03

## 2024-12-03 PROBLEM — H43.393 VITREOUS FLOATERS ; BOTH EYES: Status: ACTIVE | Noted: 2024-12-03

## 2024-12-03 PROCEDURE — 92014 COMPRE OPH EXAM EST PT 1/>: CPT | Performed by: OPHTHALMOLOGY

## 2024-12-03 PROCEDURE — 92250 FUNDUS PHOTOGRAPHY W/I&R: CPT | Performed by: OPHTHALMOLOGY

## 2024-12-03 PROCEDURE — 92083 EXTENDED VISUAL FIELD XM: CPT | Performed by: OPHTHALMOLOGY

## 2024-12-03 PROCEDURE — 92286 ANT SGM IMG I&R SPECLR MIC: CPT | Performed by: OPHTHALMOLOGY

## 2024-12-03 ASSESSMENT — REFRACTION_MANIFEST
OD_SPHERE: -1.25
OS_CYLINDER: -1.25
OD_VA1: 20/60
OS_AXIS: 072
OD_VA1: 20/60
OS_SPHERE: -0.25
OU_VA: 20/40-
OD_SPHERE: -1.25
OS_VA1: 20/60
OD_AXIS: 111
OS_CYLINDER: -1.25
OS_VA1: 20/50
OD_ADD: +3.25
OS_ADD: +3.25
OS_SPHERE: +0.25
OD_CYLINDER: -0.75
OU_VA: 20/40-1
OD_AXIS: 120
OS_AXIS: 080
OD_ADD: +3.25
OD_CYLINDER: -1.00
OS_ADD: +3.25

## 2024-12-03 ASSESSMENT — LID EXAM ASSESSMENTS
OD_BLEPHARITIS: RLL RUL
OS_BLEPHARITIS: LLL LUL

## 2024-12-03 ASSESSMENT — REFRACTION_CURRENTRX
OS_CYLINDER: -2.50
OS_OVR_VA: 20/
OS_SPHERE: +2.00
OD_SPHERE: -1.25
OS_AXIS: 106
OD_OVR_VA: 20/
OD_CYLINDER: -0.75
OS_VPRISM_DIRECTION: SV
OD_AXIS: 124
OD_VPRISM_DIRECTION: SV

## 2024-12-03 ASSESSMENT — PACHYMETRY
OD_CT_UM: 595
OS_CT_UM: 576
OS_CT_CORRECTION: -2
OD_CT_CORRECTION: -4

## 2024-12-03 ASSESSMENT — KERATOMETRY
OS_AXISANGLE_DEGREES: 131
METHOD_AUTO_MANUAL: AUTO
OD_K1POWER_DIOPTERS: 42.75
OD_AXISANGLE_DEGREES: 065
OS_K1POWER_DIOPTERS: 42.25
OD_K2POWER_DIOPTERS: 43.50
OS_K2POWER_DIOPTERS: 42.50

## 2024-12-03 ASSESSMENT — TONOMETRY
OS_IOP_MMHG: 16
OD_IOP_MMHG: 16

## 2024-12-03 ASSESSMENT — REFRACTION_AUTOREFRACTION
OS_SPHERE: +0.25
OD_CYLINDER: -1.00
OD_SPHERE: -1.25
OS_AXIS: 072
OS_CYLINDER: -1.25
OD_AXIS: 111

## 2024-12-03 ASSESSMENT — LID POSITION - PTOSIS
OS_PTOSIS: LUL
OD_PTOSIS: RUL

## 2024-12-03 ASSESSMENT — VISUAL ACUITY
OS_BCVA: 20/60+2
OD_BCVA: 20/50+2

## 2024-12-03 ASSESSMENT — CORNEAL DYSTROPHY - POSTERIOR
OS_POSTERIORDYSTROPHY: 3+ GUTTATA
OD_POSTERIORDYSTROPHY: 3+ GUTTATA

## 2024-12-03 ASSESSMENT — CONFRONTATIONAL VISUAL FIELD TEST (CVF)
OS_FINDINGS: FULL
OD_FINDINGS: FULL

## 2024-12-26 ENCOUNTER — RX RENEWAL (OUTPATIENT)
Age: 79
End: 2024-12-26

## 2025-01-23 PROBLEM — E11.9 DIABETES TYPE 2 NO RETINOPATHY ; BOTH EYES: Status: ACTIVE | Noted: 2025-01-23

## 2025-02-06 PROBLEM — H16.223 CATARACT ; LEFT EYE: Status: ACTIVE | Noted: 2025-02-06

## 2025-02-11 ENCOUNTER — APPOINTMENT (OUTPATIENT)
Age: 80
End: 2025-02-11
Payer: MEDICARE

## 2025-02-11 DIAGNOSIS — E11.9 TYPE 2 DIABETES MELLITUS W/OUT COMPLICATIONS: ICD-10-CM

## 2025-02-11 DIAGNOSIS — B35.1 TINEA UNGUIUM: ICD-10-CM

## 2025-02-11 PROCEDURE — 11721 DEBRIDE NAIL 6 OR MORE: CPT

## 2025-02-11 PROCEDURE — 99213 OFFICE O/P EST LOW 20 MIN: CPT | Mod: 25

## 2025-02-17 PROBLEM — H52.223 ASTIGMATISM, REGULAR; BOTH EYES: Status: ACTIVE | Noted: 2025-02-17

## 2025-03-18 ENCOUNTER — APPOINTMENT (OUTPATIENT)
Dept: FAMILY MEDICINE | Facility: CLINIC | Age: 80
End: 2025-03-18
Payer: MEDICARE

## 2025-03-18 VITALS
OXYGEN SATURATION: 94 % | TEMPERATURE: 97.9 F | WEIGHT: 162 LBS | DIASTOLIC BLOOD PRESSURE: 70 MMHG | BODY MASS INDEX: 28.7 KG/M2 | HEART RATE: 75 BPM | HEIGHT: 63 IN | SYSTOLIC BLOOD PRESSURE: 124 MMHG | RESPIRATION RATE: 16 BRPM

## 2025-03-18 DIAGNOSIS — Z00.00 ENCOUNTER FOR GENERAL ADULT MEDICAL EXAMINATION W/OUT ABNORMAL FINDINGS: ICD-10-CM

## 2025-03-18 PROCEDURE — 83036 HEMOGLOBIN GLYCOSYLATED A1C: CPT | Mod: QW

## 2025-03-18 PROCEDURE — 36415 COLL VENOUS BLD VENIPUNCTURE: CPT

## 2025-03-18 PROCEDURE — G0439: CPT

## 2025-03-18 PROCEDURE — 99397 PER PM REEVAL EST PAT 65+ YR: CPT

## 2025-03-20 LAB
25(OH)D3 SERPL-MCNC: 24.3 NG/ML
ALBUMIN SERPL ELPH-MCNC: 4 G/DL
ALP BLD-CCNC: 64 U/L
ALT SERPL-CCNC: 9 U/L
ANION GAP SERPL CALC-SCNC: 14 MMOL/L
APPEARANCE: CLEAR
AST SERPL-CCNC: 11 U/L
BACTERIA: ABNORMAL /HPF
BILIRUB SERPL-MCNC: 0.3 MG/DL
BILIRUBIN URINE: NEGATIVE
BLOOD URINE: NEGATIVE
BUN SERPL-MCNC: 14 MG/DL
CALCIUM SERPL-MCNC: 9.8 MG/DL
CAST: 0 /LPF
CHLORIDE SERPL-SCNC: 102 MMOL/L
CHOLEST SERPL-MCNC: 193 MG/DL
CO2 SERPL-SCNC: 24 MMOL/L
COLOR: YELLOW
CREAT SERPL-MCNC: 0.57 MG/DL
EGFRCR SERPLBLD CKD-EPI 2021: 94 ML/MIN/1.73M2
EPITHELIAL CELLS: 19 /HPF
GLUCOSE QUALITATIVE U: >=1000 MG/DL
GLUCOSE SERPL-MCNC: 187 MG/DL
HCT VFR BLD CALC: 45.1 %
HDLC SERPL-MCNC: 56 MG/DL
HGB BLD-MCNC: 14.3 G/DL
KETONES URINE: NEGATIVE MG/DL
LDLC SERPL-MCNC: 113 MG/DL
LEUKOCYTE ESTERASE URINE: NEGATIVE
MCHC RBC-ENTMCNC: 27.7 PG
MCHC RBC-ENTMCNC: 31.7 G/DL
MCV RBC AUTO: 87.2 FL
MICROSCOPIC-UA: NORMAL
NITRITE URINE: NEGATIVE
NONHDLC SERPL-MCNC: 137 MG/DL
PH URINE: 5.5
PLATELET # BLD AUTO: 245 K/UL
POTASSIUM SERPL-SCNC: 3.8 MMOL/L
PROT SERPL-MCNC: 6.9 G/DL
PROTEIN URINE: NEGATIVE MG/DL
RBC # BLD: 5.17 M/UL
RBC # FLD: 13.2 %
RED BLOOD CELLS URINE: 1 /HPF
SODIUM SERPL-SCNC: 141 MMOL/L
SPECIFIC GRAVITY URINE: >1.03
TRIGL SERPL-MCNC: 140 MG/DL
TSH SERPL-ACNC: 2.26 UIU/ML
UROBILINOGEN URINE: 0.2 MG/DL
WBC # FLD AUTO: 4.2 K/UL
WHITE BLOOD CELLS URINE: 4 /HPF

## 2025-04-08 ENCOUNTER — RESULT REVIEW (OUTPATIENT)
Age: 80
End: 2025-04-08

## 2025-04-08 ENCOUNTER — APPOINTMENT (OUTPATIENT)
Dept: MAMMOGRAPHY | Facility: CLINIC | Age: 80
End: 2025-04-08
Payer: MEDICARE

## 2025-04-08 ENCOUNTER — APPOINTMENT (OUTPATIENT)
Dept: MRI IMAGING | Facility: CLINIC | Age: 80
End: 2025-04-08
Payer: MEDICARE

## 2025-04-08 ENCOUNTER — OUTPATIENT (OUTPATIENT)
Dept: OUTPATIENT SERVICES | Facility: HOSPITAL | Age: 80
LOS: 1 days | End: 2025-04-08
Payer: MEDICARE

## 2025-04-08 DIAGNOSIS — R20.0 ANESTHESIA OF SKIN: ICD-10-CM

## 2025-04-08 DIAGNOSIS — M54.50 LOW BACK PAIN, UNSPECIFIED: ICD-10-CM

## 2025-04-08 PROCEDURE — 77067 SCR MAMMO BI INCL CAD: CPT | Mod: 26

## 2025-04-08 PROCEDURE — 77063 BREAST TOMOSYNTHESIS BI: CPT

## 2025-04-08 PROCEDURE — 77067 SCR MAMMO BI INCL CAD: CPT

## 2025-04-08 PROCEDURE — 77063 BREAST TOMOSYNTHESIS BI: CPT | Mod: 26

## 2025-04-08 PROCEDURE — 72148 MRI LUMBAR SPINE W/O DYE: CPT

## 2025-04-08 PROCEDURE — 72148 MRI LUMBAR SPINE W/O DYE: CPT | Mod: 26

## 2025-05-20 ENCOUNTER — APPOINTMENT (OUTPATIENT)
Age: 80
End: 2025-05-20
Payer: MEDICARE

## 2025-05-20 DIAGNOSIS — B35.1 TINEA UNGUIUM: ICD-10-CM

## 2025-05-20 DIAGNOSIS — E11.9 TYPE 2 DIABETES MELLITUS W/OUT COMPLICATIONS: ICD-10-CM

## 2025-05-20 DIAGNOSIS — E11.40 TYPE 2 DIABETES MELLITUS WITH DIABETIC NEUROPATHY, UNSPECIFIED: ICD-10-CM

## 2025-05-20 PROCEDURE — 99213 OFFICE O/P EST LOW 20 MIN: CPT | Mod: 25

## 2025-05-20 PROCEDURE — 11721 DEBRIDE NAIL 6 OR MORE: CPT | Mod: Q8

## 2025-05-29 ENCOUNTER — NON-APPOINTMENT (OUTPATIENT)
Age: 80
End: 2025-05-29

## 2025-06-02 ENCOUNTER — APPOINTMENT (OUTPATIENT)
Dept: FAMILY MEDICINE | Facility: CLINIC | Age: 80
End: 2025-06-02
Payer: MEDICARE

## 2025-06-02 ENCOUNTER — LABORATORY RESULT (OUTPATIENT)
Age: 80
End: 2025-06-02

## 2025-06-02 VITALS
SYSTOLIC BLOOD PRESSURE: 122 MMHG | BODY MASS INDEX: 28.7 KG/M2 | DIASTOLIC BLOOD PRESSURE: 68 MMHG | HEART RATE: 79 BPM | TEMPERATURE: 97.9 F | RESPIRATION RATE: 14 BRPM | HEIGHT: 63 IN | WEIGHT: 162 LBS | OXYGEN SATURATION: 97 %

## 2025-06-02 DIAGNOSIS — M85.80 OTHER SPECIFIED DISORDERS OF BONE DENSITY AND STRUCTURE, UNSPECIFIED SITE: ICD-10-CM

## 2025-06-02 DIAGNOSIS — R53.81 OTHER MALAISE: ICD-10-CM

## 2025-06-02 DIAGNOSIS — I10 ESSENTIAL (PRIMARY) HYPERTENSION: ICD-10-CM

## 2025-06-02 DIAGNOSIS — Z79.899 OTHER LONG TERM (CURRENT) DRUG THERAPY: ICD-10-CM

## 2025-06-02 DIAGNOSIS — E78.00 PURE HYPERCHOLESTEROLEMIA, UNSPECIFIED: ICD-10-CM

## 2025-06-02 DIAGNOSIS — E11.9 TYPE 2 DIABETES MELLITUS W/OUT COMPLICATIONS: ICD-10-CM

## 2025-06-02 DIAGNOSIS — M54.50 LOW BACK PAIN, UNSPECIFIED: ICD-10-CM

## 2025-06-02 DIAGNOSIS — R10.9 UNSPECIFIED ABDOMINAL PAIN: ICD-10-CM

## 2025-06-02 DIAGNOSIS — D58.2 OTHER HEMOGLOBINOPATHIES: ICD-10-CM

## 2025-06-02 DIAGNOSIS — E11.40 TYPE 2 DIABETES MELLITUS WITH DIABETIC NEUROPATHY, UNSPECIFIED: ICD-10-CM

## 2025-06-02 DIAGNOSIS — R53.83 OTHER MALAISE: ICD-10-CM

## 2025-06-02 PROCEDURE — 36415 COLL VENOUS BLD VENIPUNCTURE: CPT

## 2025-06-02 PROCEDURE — 99214 OFFICE O/P EST MOD 30 MIN: CPT

## 2025-06-02 PROCEDURE — 83036 HEMOGLOBIN GLYCOSYLATED A1C: CPT | Mod: QW

## 2025-06-02 PROCEDURE — G2211 COMPLEX E/M VISIT ADD ON: CPT

## 2025-06-03 ENCOUNTER — OFFICE (OUTPATIENT)
Dept: URBAN - METROPOLITAN AREA CLINIC 6 | Facility: CLINIC | Age: 80
Setting detail: OPHTHALMOLOGY
End: 2025-06-03
Payer: COMMERCIAL

## 2025-06-03 DIAGNOSIS — H40.013: ICD-10-CM

## 2025-06-03 DIAGNOSIS — H01.004: ICD-10-CM

## 2025-06-03 DIAGNOSIS — H18.513: ICD-10-CM

## 2025-06-03 DIAGNOSIS — H35.3131: ICD-10-CM

## 2025-06-03 DIAGNOSIS — H52.4: ICD-10-CM

## 2025-06-03 DIAGNOSIS — H26.493: ICD-10-CM

## 2025-06-03 DIAGNOSIS — H01.001: ICD-10-CM

## 2025-06-03 DIAGNOSIS — H43.393: ICD-10-CM

## 2025-06-03 DIAGNOSIS — H01.005: ICD-10-CM

## 2025-06-03 DIAGNOSIS — H43.813: ICD-10-CM

## 2025-06-03 DIAGNOSIS — H02.403: ICD-10-CM

## 2025-06-03 DIAGNOSIS — H01.002: ICD-10-CM

## 2025-06-03 PROCEDURE — 99213 OFFICE O/P EST LOW 20 MIN: CPT | Performed by: OPHTHALMOLOGY

## 2025-06-03 PROCEDURE — 76514 ECHO EXAM OF EYE THICKNESS: CPT | Performed by: OPHTHALMOLOGY

## 2025-06-03 PROCEDURE — 92133 CPTRZD OPH DX IMG PST SGM ON: CPT | Performed by: OPHTHALMOLOGY

## 2025-06-03 PROCEDURE — 92015 DETERMINE REFRACTIVE STATE: CPT | Performed by: OPHTHALMOLOGY

## 2025-06-03 ASSESSMENT — VISUAL ACUITY
OD_BCVA: 20/50
OS_BCVA: 20/60

## 2025-06-03 ASSESSMENT — PACHYMETRY
OS_CT_UM: 581
OD_CT_CORRECTION: -3
OS_CT_CORRECTION: -3
OD_CT_UM: 584

## 2025-06-03 ASSESSMENT — REFRACTION_CURRENTRX
OS_CYLINDER: -2.50
OD_CYLINDER: -0.75
OD_VPRISM_DIRECTION: SV
OS_VPRISM_DIRECTION: SV
OD_AXIS: 124
OS_SPHERE: +2.00
OD_SPHERE: -1.25
OS_OVR_VA: 20/
OS_AXIS: 106
OD_OVR_VA: 20/

## 2025-06-03 ASSESSMENT — CORNEAL DYSTROPHY - POSTERIOR
OD_POSTERIORDYSTROPHY: 3+ GUTTATA
OS_POSTERIORDYSTROPHY: 3+ GUTTATA

## 2025-06-03 ASSESSMENT — KERATOMETRY
METHOD_AUTO_MANUAL: AUTO
OD_K2POWER_DIOPTERS: 43.50
OS_K2POWER_DIOPTERS: 42.50
OS_K1POWER_DIOPTERS: 42.25
OD_AXISANGLE_DEGREES: 065
OD_K1POWER_DIOPTERS: 42.75
OS_AXISANGLE_DEGREES: 131

## 2025-06-03 ASSESSMENT — REFRACTION_MANIFEST
OD_VA1: 20/50-
OS_ADD: +3.25
OS_VA1: 20/50
OD_CYLINDER: -0.50
OS_VA1: 20/60
OD_AXIS: 120
OD_ADD: +3.25
OS_AXIS: 075
OS_SPHERE: -0.25
OU_VA: 20/40-
OS_SPHERE: PLANO
OD_SPHERE: -1.25
OD_AXIS: 120
OS_AXIS: 080
OS_ADD: +3.25
OS_CYLINDER: -0.75
OD_SPHERE: -1.25
OD_VA1: 20/60
OU_VA: 20/40-
OD_CYLINDER: -0.75
OD_ADD: +3.25
OS_CYLINDER: -1.25

## 2025-06-03 ASSESSMENT — REFRACTION_AUTOREFRACTION
OD_AXIS: 122
OS_SPHERE: +0.25
OS_AXIS: 073
OD_CYLINDER: -0.50
OD_SPHERE: -1.25
OS_CYLINDER: -1.50

## 2025-06-03 ASSESSMENT — LID EXAM ASSESSMENTS
OS_BLEPHARITIS: LLL LUL
OD_BLEPHARITIS: RLL RUL

## 2025-06-03 ASSESSMENT — LID POSITION - PTOSIS
OS_PTOSIS: LUL
OD_PTOSIS: RUL

## 2025-06-03 ASSESSMENT — CONFRONTATIONAL VISUAL FIELD TEST (CVF)
OD_FINDINGS: FULL
OS_FINDINGS: FULL

## 2025-06-03 ASSESSMENT — TONOMETRY
OD_IOP_MMHG: 15
OS_IOP_MMHG: 16

## 2025-06-05 LAB
ALBUMIN SERPL ELPH-MCNC: 3.8 G/DL
ALP BLD-CCNC: 62 U/L
ALT SERPL-CCNC: 20 U/L
ANION GAP SERPL CALC-SCNC: 15 MMOL/L
AST SERPL-CCNC: 34 U/L
BASOPHILS # BLD AUTO: 0.02 K/UL
BASOPHILS NFR BLD AUTO: 0.4 %
BILIRUB SERPL-MCNC: 0.3 MG/DL
BUN SERPL-MCNC: 16 MG/DL
CALCIUM SERPL-MCNC: 9.6 MG/DL
CHLORIDE SERPL-SCNC: 103 MMOL/L
CO2 SERPL-SCNC: 23 MMOL/L
CREAT SERPL-MCNC: 0.61 MG/DL
EBV EA AB SER IA-ACNC: 49 U/ML
EBV EA AB TITR SER IF: NEGATIVE
EBV EA IGG SER QL IA: <3 U/ML
EBV EA IGG SER-ACNC: POSITIVE
EBV EA IGM SER IA-ACNC: NEGATIVE
EBV PATRN SPEC IB-IMP: NORMAL
EBV VCA IGG SER IA-ACNC: 142 U/ML
EBV VCA IGM SER QL IA: <10 U/ML
EGFRCR SERPLBLD CKD-EPI 2021: 92 ML/MIN/1.73M2
EOSINOPHIL # BLD AUTO: 0.1 K/UL
EOSINOPHIL NFR BLD AUTO: 2.1 %
EPSTEIN-BARR VIRUS CAPSID ANTIGEN IGG: POSITIVE
GLUCOSE SERPL-MCNC: 163 MG/DL
HCT VFR BLD CALC: 46.2 %
HGB BLD-MCNC: 13.9 G/DL
IMM GRANULOCYTES NFR BLD AUTO: 0.2 %
LYMPHOCYTES # BLD AUTO: 1.66 K/UL
LYMPHOCYTES NFR BLD AUTO: 34.6 %
MAN DIFF?: NORMAL
MCHC RBC-ENTMCNC: 25.3 PG
MCHC RBC-ENTMCNC: 30.1 G/DL
MCV RBC AUTO: 84.2 FL
MONOCYTES # BLD AUTO: 0.56 K/UL
MONOCYTES NFR BLD AUTO: 11.7 %
NEUTROPHILS # BLD AUTO: 2.45 K/UL
NEUTROPHILS NFR BLD AUTO: 51 %
PLATELET # BLD AUTO: 283 K/UL
POTASSIUM SERPL-SCNC: 4.7 MMOL/L
PROT SERPL-MCNC: 6.7 G/DL
RBC # BLD: 5.49 M/UL
RBC # FLD: 14.2 %
SODIUM SERPL-SCNC: 142 MMOL/L
VIT B12 SERPL-MCNC: 195 PG/ML
WBC # FLD AUTO: 4.8 K/UL

## 2025-06-10 ENCOUNTER — APPOINTMENT (OUTPATIENT)
Dept: ORTHOPEDIC SURGERY | Facility: CLINIC | Age: 80
End: 2025-06-10
Payer: MEDICARE

## 2025-06-10 VITALS — WEIGHT: 158 LBS | HEIGHT: 65 IN | BODY MASS INDEX: 26.33 KG/M2

## 2025-06-10 PROBLEM — M54.16 LUMBAR RADICULOPATHY: Status: ACTIVE | Noted: 2025-06-10

## 2025-06-10 PROCEDURE — 99204 OFFICE O/P NEW MOD 45 MIN: CPT

## 2025-06-30 ENCOUNTER — EMERGENCY (EMERGENCY)
Facility: HOSPITAL | Age: 80
LOS: 1 days | End: 2025-06-30
Attending: EMERGENCY MEDICINE
Payer: MEDICARE

## 2025-06-30 ENCOUNTER — APPOINTMENT (OUTPATIENT)
Dept: CT IMAGING | Facility: CLINIC | Age: 80
End: 2025-06-30
Payer: MEDICARE

## 2025-06-30 ENCOUNTER — OUTPATIENT (OUTPATIENT)
Dept: OUTPATIENT SERVICES | Facility: HOSPITAL | Age: 80
LOS: 1 days | End: 2025-06-30
Payer: MEDICARE

## 2025-06-30 VITALS
TEMPERATURE: 99 F | RESPIRATION RATE: 18 BRPM | HEART RATE: 88 BPM | DIASTOLIC BLOOD PRESSURE: 71 MMHG | OXYGEN SATURATION: 95 % | SYSTOLIC BLOOD PRESSURE: 137 MMHG

## 2025-06-30 DIAGNOSIS — R10.9 UNSPECIFIED ABDOMINAL PAIN: ICD-10-CM

## 2025-06-30 PROBLEM — H52.223 ASTIGMATISM, REGULAR; BOTH EYES: Status: ACTIVE | Noted: 2025-06-16

## 2025-06-30 PROCEDURE — 74176 CT ABD & PELVIS W/O CONTRAST: CPT | Mod: 26

## 2025-06-30 PROCEDURE — 29515 APPLICATION SHORT LEG SPLINT: CPT | Mod: LT

## 2025-06-30 PROCEDURE — 73630 X-RAY EXAM OF FOOT: CPT | Mod: 26,LT

## 2025-06-30 PROCEDURE — 99284 EMERGENCY DEPT VISIT MOD MDM: CPT | Mod: 25

## 2025-06-30 PROCEDURE — 73610 X-RAY EXAM OF ANKLE: CPT

## 2025-06-30 PROCEDURE — 74176 CT ABD & PELVIS W/O CONTRAST: CPT

## 2025-06-30 PROCEDURE — 73630 X-RAY EXAM OF FOOT: CPT

## 2025-06-30 PROCEDURE — 73610 X-RAY EXAM OF ANKLE: CPT | Mod: 26,LT

## 2025-06-30 NOTE — ED PROVIDER NOTE - IV ALTEPLASE DOOR HIDDEN
Blood work done on August 25    Wants to know the results    Please call patient  
Result messge sent to triage.  
show

## 2025-06-30 NOTE — ED PROVIDER NOTE - OBJECTIVE STATEMENT
79yof tripped over her sandal and rolled her left ankle. Has abrasion to the right knee and elbow but otherwise denies any other injuries. She has been unable to bear weight since ankle injury.

## 2025-06-30 NOTE — ED PROVIDER NOTE - PATIENT PORTAL LINK FT
You can access the FollowMyHealth Patient Portal offered by [facility] by registering at the following website: [email or web address]. By joining [employer]’s FollowMyHealth portal, you will also be able to view your health information using other applications (apps) compatible with our system.

## 2025-06-30 NOTE — ED PROVIDER NOTE - CARE PROVIDER_API CALL
Chuck Ruiz  Podiatry Foot & Ankle Surgery  [address] [place] [id number]  Phone: [phone]  Fax: [phone]  Follow Up Time:

## 2025-06-30 NOTE — ED PROVIDER NOTE - NSFOLLOWUPINSTRUCTIONS_ED_ALL_ED_FT
Follow up with podiatry within 1-2 weeks  Keep the splint clean, dry and intact until further directed.  Keep the injured extremity elevated above the level of your heart while at rest.  Take ibuprofen 600mg every 6 hours and/or acetaminophen 1000mg every 6 hours as needed for pain.  Return to the ER if you have severe or worsening pain, numbness/tingling/color change to your hand/finger tips, or any other new symptoms.

## 2025-06-30 NOTE — ED ADULT TRIAGE NOTE - CHIEF COMPLAINT QUOTE
pt comes in with c/o left foot swelling after tripping and twisting her ankle, pt denies any headstrike and is not on thinners, pt is axox4 says the pain is an 8/10, no other complaints at this time

## 2025-07-01 ENCOUNTER — APPOINTMENT (OUTPATIENT)
Age: 80
End: 2025-07-01
Payer: MEDICARE

## 2025-07-01 VITALS
TEMPERATURE: 98 F | SYSTOLIC BLOOD PRESSURE: 136 MMHG | DIASTOLIC BLOOD PRESSURE: 71 MMHG | RESPIRATION RATE: 16 BRPM | OXYGEN SATURATION: 91 % | HEART RATE: 91 BPM

## 2025-07-01 PROCEDURE — A4590: CPT

## 2025-07-01 PROCEDURE — 99213 OFFICE O/P EST LOW 20 MIN: CPT | Mod: 25

## 2025-07-01 PROCEDURE — 29405 APPL SHORT LEG CAST: CPT | Mod: LT

## 2025-07-01 NOTE — ED ADULT NURSE NOTE - OBJECTIVE STATEMENT
pt comes in with c/o left foot swelling after tripping and twisting her ankle, pt denies any head strike and is not on blood thinners, pt is axox4 says the pain is an 8/10, no other complaints at this time

## 2025-07-06 DIAGNOSIS — M25.561 PAIN IN RIGHT KNEE: ICD-10-CM

## 2025-07-06 DIAGNOSIS — S92.355A NONDISPLACED FRACTURE OF FIFTH METATARSAL BONE, LEFT FOOT, INITIAL ENCOUNTER FOR CLOSED FRACTURE: ICD-10-CM
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
## 2025-07-06 DIAGNOSIS — S80.211A ABRASION, RIGHT KNEE, INITIAL ENCOUNTER: ICD-10-CM
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
## 2025-07-14 ENCOUNTER — APPOINTMENT (OUTPATIENT)
Age: 80
End: 2025-07-14
Payer: MEDICARE

## 2025-07-14 PROCEDURE — 73630 X-RAY EXAM OF FOOT: CPT

## 2025-07-14 PROCEDURE — 99213 OFFICE O/P EST LOW 20 MIN: CPT

## 2025-07-15 ENCOUNTER — APPOINTMENT (OUTPATIENT)
Dept: PAIN MANAGEMENT | Facility: CLINIC | Age: 80
End: 2025-07-15

## 2025-07-29 ENCOUNTER — APPOINTMENT (OUTPATIENT)
Age: 80
End: 2025-07-29
Payer: MEDICARE

## 2025-07-29 DIAGNOSIS — E11.40 TYPE 2 DIABETES MELLITUS WITH DIABETIC NEUROPATHY, UNSPECIFIED: ICD-10-CM

## 2025-07-29 DIAGNOSIS — B35.1 TINEA UNGUIUM: ICD-10-CM

## 2025-07-29 DIAGNOSIS — S92.302A FRACTURE OF UNSPECIFIED METATARSAL BONE(S), LEFT FOOT, INITIAL ENCOUNTER FOR CLOSED FRACTURE: ICD-10-CM

## 2025-07-29 PROCEDURE — 11721 DEBRIDE NAIL 6 OR MORE: CPT

## 2025-07-29 PROCEDURE — 73630 X-RAY EXAM OF FOOT: CPT | Mod: LT

## 2025-07-29 PROCEDURE — 99213 OFFICE O/P EST LOW 20 MIN: CPT | Mod: 25

## 2025-08-19 ENCOUNTER — APPOINTMENT (OUTPATIENT)
Age: 80
End: 2025-08-19
Payer: MEDICARE

## 2025-08-19 PROCEDURE — 73630 X-RAY EXAM OF FOOT: CPT

## 2025-08-19 PROCEDURE — 99213 OFFICE O/P EST LOW 20 MIN: CPT | Mod: 25
